# Patient Record
Sex: MALE | Race: WHITE | URBAN - METROPOLITAN AREA
[De-identification: names, ages, dates, MRNs, and addresses within clinical notes are randomized per-mention and may not be internally consistent; named-entity substitution may affect disease eponyms.]

---

## 2017-12-28 ENCOUNTER — TELEPHONE (OUTPATIENT)
Dept: FAMILY MEDICINE CLINIC | Age: 72
End: 2017-12-28

## 2017-12-28 DIAGNOSIS — R73.01 IFG (IMPAIRED FASTING GLUCOSE): Primary | ICD-10-CM

## 2017-12-28 DIAGNOSIS — E78.2 MIXED HYPERLIPIDEMIA: ICD-10-CM

## 2018-01-03 ENCOUNTER — HOSPITAL ENCOUNTER (OUTPATIENT)
Dept: LAB | Age: 73
Discharge: HOME | End: 2018-01-03
Payer: MEDICARE

## 2018-01-03 DIAGNOSIS — E78.2 MIXED HYPERLIPIDEMIA: ICD-10-CM

## 2018-01-03 DIAGNOSIS — R73.01 IFG (IMPAIRED FASTING GLUCOSE): ICD-10-CM

## 2018-01-03 LAB
ALBUMIN SERPL-MCNC: 4 G/DL (ref 3.4–5)
ALBUMIN/GLOB SERPL: 1.1 {RATIO} (ref 0.8–1.7)
ALP SERPL-CCNC: 68 U/L (ref 45–117)
ALT SERPL-CCNC: 41 U/L (ref 16–61)
ANION GAP SERPL CALC-SCNC: 7 MMOL/L (ref 3–18)
AST SERPL-CCNC: 22 U/L (ref 15–37)
BASOPHILS # BLD: 0 K/UL (ref 0–0.06)
BASOPHILS NFR BLD: 0 % (ref 0–2)
BILIRUB SERPL-MCNC: 0.5 MG/DL (ref 0.2–1)
BUN SERPL-MCNC: 17 MG/DL (ref 7–18)
BUN/CREAT SERPL: 19 (ref 12–20)
CALCIUM SERPL-MCNC: 8.8 MG/DL (ref 8.5–10.1)
CHLORIDE SERPL-SCNC: 105 MMOL/L (ref 100–108)
CHOLEST SERPL-MCNC: 241 MG/DL
CO2 SERPL-SCNC: 28 MMOL/L (ref 21–32)
CREAT SERPL-MCNC: 0.89 MG/DL (ref 0.6–1.3)
DIFFERENTIAL METHOD BLD: ABNORMAL
EOSINOPHIL # BLD: 0.1 K/UL (ref 0–0.4)
EOSINOPHIL NFR BLD: 2 % (ref 0–5)
ERYTHROCYTE [DISTWIDTH] IN BLOOD BY AUTOMATED COUNT: 13 % (ref 11.6–14.5)
GLOBULIN SER CALC-MCNC: 3.5 G/DL (ref 2–4)
GLUCOSE SERPL-MCNC: 93 MG/DL (ref 74–99)
HCT VFR BLD AUTO: 47 % (ref 36–48)
HDLC SERPL-MCNC: 59 MG/DL (ref 40–60)
HDLC SERPL: 4.1 {RATIO} (ref 0–5)
HGB BLD-MCNC: 15.8 G/DL (ref 13–16)
LDLC SERPL CALC-MCNC: 162.6 MG/DL (ref 0–100)
LIPID PROFILE,FLP: ABNORMAL
LYMPHOCYTES # BLD: 3 K/UL (ref 0.9–3.6)
LYMPHOCYTES NFR BLD: 49 % (ref 21–52)
MCH RBC QN AUTO: 32 PG (ref 24–34)
MCHC RBC AUTO-ENTMCNC: 33.6 G/DL (ref 31–37)
MCV RBC AUTO: 95.3 FL (ref 74–97)
MONOCYTES # BLD: 0.7 K/UL (ref 0.05–1.2)
MONOCYTES NFR BLD: 11 % (ref 3–10)
NEUTS SEG # BLD: 2.4 K/UL (ref 1.8–8)
NEUTS SEG NFR BLD: 38 % (ref 40–73)
PLATELET # BLD AUTO: 311 K/UL (ref 135–420)
PMV BLD AUTO: 10.9 FL (ref 9.2–11.8)
POTASSIUM SERPL-SCNC: 4.9 MMOL/L (ref 3.5–5.5)
PROT SERPL-MCNC: 7.5 G/DL (ref 6.4–8.2)
RBC # BLD AUTO: 4.93 M/UL (ref 4.7–5.5)
SODIUM SERPL-SCNC: 140 MMOL/L (ref 136–145)
TRIGL SERPL-MCNC: 97 MG/DL (ref ?–150)
VLDLC SERPL CALC-MCNC: 19.4 MG/DL
WBC # BLD AUTO: 6.3 K/UL (ref 4.6–13.2)

## 2018-01-03 PROCEDURE — 80053 COMPREHEN METABOLIC PANEL: CPT | Performed by: INTERNAL MEDICINE

## 2018-01-03 PROCEDURE — 85025 COMPLETE CBC W/AUTO DIFF WBC: CPT | Performed by: INTERNAL MEDICINE

## 2018-01-03 PROCEDURE — 80061 LIPID PANEL: CPT | Performed by: INTERNAL MEDICINE

## 2018-01-03 PROCEDURE — 36415 COLL VENOUS BLD VENIPUNCTURE: CPT | Performed by: INTERNAL MEDICINE

## 2018-01-10 ENCOUNTER — OFFICE VISIT (OUTPATIENT)
Dept: FAMILY MEDICINE CLINIC | Age: 73
End: 2018-01-10

## 2018-01-10 VITALS
TEMPERATURE: 97.4 F | HEART RATE: 82 BPM | WEIGHT: 180 LBS | HEIGHT: 70 IN | BODY MASS INDEX: 25.77 KG/M2 | SYSTOLIC BLOOD PRESSURE: 130 MMHG | DIASTOLIC BLOOD PRESSURE: 80 MMHG | RESPIRATION RATE: 20 BRPM | OXYGEN SATURATION: 92 %

## 2018-01-10 DIAGNOSIS — Z00.00 MEDICARE ANNUAL WELLNESS VISIT, SUBSEQUENT: Primary | ICD-10-CM

## 2018-01-10 DIAGNOSIS — E78.00 PURE HYPERCHOLESTEROLEMIA: ICD-10-CM

## 2018-01-10 NOTE — PATIENT INSTRUCTIONS

## 2018-01-10 NOTE — PROGRESS NOTES
This is a Subsequent Medicare Annual Wellness Exam (AWV) (Performed 12 months after IPPE or effective date of Medicare Part B enrollment)    I have reviewed the patient's medical history in detail and updated the computerized patient record. History     Past Medical History:   Diagnosis Date    Arthritis     Duodenal ulcer     Microhematuria     Nocturia     Prostate cancer screening       Past Surgical History:   Procedure Laterality Date    HX ENDOSCOPY  2002    ENDOSCOPY,COLON, DIAGNOSTIC    HX HERNIA REPAIR Right 12/2012    Inguinal.     Current Outpatient Prescriptions   Medication Sig Dispense Refill    aspirin (ASPIRIN) 325 mg tablet Take 325 mg by mouth daily. No Known Allergies  Family History   Problem Relation Age of Onset   [address] [facility] Arthritis-osteo Father     Hypertension Father      Social History   Substance Use Topics    Smoking status: Never Smoker    Smokeless tobacco: Never Used    Alcohol use No     Patient Active Problem List   Diagnosis Code    Other and unspecified hyperlipidemia E78.5    IFG (impaired fasting glucose) R73.01    Advance directive discussed with patient Z71.89       Depression Risk Factor Screening:     PHQ over the last two weeks 1/10/2018   Little interest or pleasure in doing things Not at all   Feeling down, depressed or hopeless Not at all   Total Score PHQ 2 0     Alcohol Risk Factor Screening: You do not drink alcohol or very rarely. Functional Ability and Level of Safety:   Hearing Loss  Hearing is good. Activities of Daily Living  The home contains: handrails and rugs  Patient does total self care    Fall Risk  Fall Risk Assessment, last 12 mths 1/10/2018   Able to walk? Yes   Fall in past 12 months?  No       Abuse Screen  Patient is not abused    Cognitive Screening   Evaluation of Cognitive Function:  Has your family/caregiver stated any concerns about your memory: no  Normal    Patient Care Team   Patient Care Team:  Nitin White MD as PCP - General (Internal Medicine)  Yeni Johnston MD (Urology)  Tyler Christy MD (Gastroenterology)    Assessment/Plan   Education and counseling provided:  Are appropriate based on today's review and evaluation  End-of-Life planning (with patient's consent), discussed AMD, pt is DNR, he did complete the forms today, will scan in chart  Influenza Vaccine, pt declined  Screening for glaucoma, he will see optometry next month    Diagnoses and all orders for this visit:    1. Medicare annual wellness visit, subsequent    2. Pure hypercholesterolemia, recent labs noted, pt declined to use lipitor, wants to repeat labs in 6 months  -     LIPID PANEL; Future        Health Maintenance Due   Topic Date Due    MEDICARE YEARLY EXAM  11/03/2017    GLAUCOMA SCREENING Q2Y  02/15/2018     Lab Results   Component Value Date/Time    Cholesterol, total 241 01/03/2018 07:52 AM    HDL Cholesterol 59 01/03/2018 07:52 AM    LDL, calculated 162.6 01/03/2018 07:52 AM    VLDL, calculated 19.4 01/03/2018 07:52 AM    Triglyceride 97 01/03/2018 07:52 AM    CHOL/HDL Ratio 4.1 01/03/2018 07:52 AM     Lab Results   Component Value Date/Time    Sodium 140 01/03/2018 07:52 AM    Potassium 4.9 01/03/2018 07:52 AM    Chloride 105 01/03/2018 07:52 AM    CO2 28 01/03/2018 07:52 AM    Anion gap 7 01/03/2018 07:52 AM    Glucose 93 01/03/2018 07:52 AM    BUN 17 01/03/2018 07:52 AM    Creatinine 0.89 01/03/2018 07:52 AM    BUN/Creatinine ratio 19 01/03/2018 07:52 AM    GFR est AA >60 01/03/2018 07:52 AM    GFR est non-AA >60 01/03/2018 07:52 AM    Calcium 8.8 01/03/2018 07:52 AM    Bilirubin, total 0.5 01/03/2018 07:52 AM    AST (SGOT) 22 01/03/2018 07:52 AM    Alk.  phosphatase 68 01/03/2018 07:52 AM    Protein, total 7.5 01/03/2018 07:52 AM    Albumin 4.0 01/03/2018 07:52 AM    Globulin 3.5 01/03/2018 07:52 AM    A-G Ratio 1.1 01/03/2018 07:52 AM    ALT (SGPT) 41 01/03/2018 07:52 AM   rtc 6 mos

## 2018-01-10 NOTE — PROGRESS NOTES
Jennifer Millard is a 81 y.o. male (: 1945) presenting to address:    Chief Complaint   Patient presents with    Complete Physical       Vitals:    01/10/18 1025   BP: 118/83   Pulse: 82   Resp: 20   Temp: 97.4 °F (36.3 °C)   TempSrc: Oral   SpO2: 92%   Weight: 180 lb (81.6 kg)   Height: 5' 10\" (1.778 m)   PainSc:   0 - No pain       Hearing/Vision:   No exam data present    Learning Assessment:     Learning Assessment 2015   PRIMARY LEARNER Patient   HIGHEST LEVEL OF EDUCATION - PRIMARY LEARNER  > 4 YEARS OF COLLEGE   BARRIERS PRIMARY LEARNER NONE   CO-LEARNER CAREGIVER No   PRIMARY LANGUAGE ENGLISH    NEED No   LEARNER PREFERENCE PRIMARY OTHER (COMMENT)   LEARNING SPECIAL TOPICS none   ANSWERED BY patient   RELATIONSHIP SELF   ASSESSMENT COMMENT n/a     Depression Screening:     PHQ over the last two weeks 1/10/2018   Little interest or pleasure in doing things Not at all   Feeling down, depressed or hopeless Not at all   Total Score PHQ 2 0     Fall Risk Assessment:     Fall Risk Assessment, last 12 mths 1/10/2018   Able to walk? Yes   Fall in past 12 months? No     Abuse Screening:     Abuse Screening Questionnaire 2016   Do you ever feel afraid of your partner? N   Are you in a relationship with someone who physically or mentally threatens you? N   Is it safe for you to go home? Y     Coordination of Care Questionaire:   1. Have you been to the ER, urgent care clinic since your last visit? Hospitalized since your last visit? NO    2. Have you seen or consulted any other health care providers outside of the [address] [place] [address] since your last visit? Include any pap smears or colon screening. NO    Advanced Directive:   1. Do you have an Advanced Directive? YES    2. Would you like information on Advanced Directives?  NO

## 2019-03-20 ENCOUNTER — HOSPITAL ENCOUNTER (OUTPATIENT)
Dept: LAB | Age: 74
Discharge: HOME | End: 2019-03-20
Payer: MEDICARE

## 2019-03-20 ENCOUNTER — OFFICE VISIT (OUTPATIENT)
Dept: FAMILY MEDICINE CLINIC | Age: 74
End: 2019-03-20

## 2019-03-20 VITALS
WEIGHT: 182.2 LBS | BODY MASS INDEX: 26.08 KG/M2 | SYSTOLIC BLOOD PRESSURE: 152 MMHG | HEART RATE: 81 BPM | TEMPERATURE: 96.4 F | OXYGEN SATURATION: 95 % | HEIGHT: 70 IN | DIASTOLIC BLOOD PRESSURE: 90 MMHG | RESPIRATION RATE: 18 BRPM

## 2019-03-20 DIAGNOSIS — Z12.5 PROSTATE CANCER SCREENING: ICD-10-CM

## 2019-03-20 DIAGNOSIS — E78.2 MIXED HYPERLIPIDEMIA: ICD-10-CM

## 2019-03-20 DIAGNOSIS — R73.01 IFG (IMPAIRED FASTING GLUCOSE): ICD-10-CM

## 2019-03-20 DIAGNOSIS — M75.41 IMPINGEMENT SYNDROME OF RIGHT SHOULDER: Primary | ICD-10-CM

## 2019-03-20 LAB
ALBUMIN SERPL-MCNC: 4.5 G/DL (ref 3.4–5)
ALBUMIN/GLOB SERPL: 1.6 {RATIO} (ref 0.8–1.7)
ALP SERPL-CCNC: 70 U/L (ref 45–117)
ALT SERPL-CCNC: 37 U/L (ref 16–61)
ANION GAP SERPL CALC-SCNC: 9 MMOL/L (ref 3–18)
APPEARANCE UR: CLEAR
AST SERPL-CCNC: 18 U/L (ref 15–37)
BASOPHILS # BLD: 0 K/UL (ref 0–0.1)
BASOPHILS NFR BLD: 1 % (ref 0–2)
BILIRUB SERPL-MCNC: 0.5 MG/DL (ref 0.2–1)
BILIRUB UR QL: NEGATIVE
BUN SERPL-MCNC: 13 MG/DL (ref 7–18)
BUN/CREAT SERPL: 15 (ref 12–20)
CALCIUM SERPL-MCNC: 8.8 MG/DL (ref 8.5–10.1)
CHLORIDE SERPL-SCNC: 102 MMOL/L (ref 100–108)
CHOLEST SERPL-MCNC: 235 MG/DL
CO2 SERPL-SCNC: 25 MMOL/L (ref 21–32)
COLOR UR: YELLOW
CREAT SERPL-MCNC: 0.89 MG/DL (ref 0.6–1.3)
DIFFERENTIAL METHOD BLD: ABNORMAL
EOSINOPHIL # BLD: 0.1 K/UL (ref 0–0.4)
EOSINOPHIL NFR BLD: 2 % (ref 0–5)
ERYTHROCYTE [DISTWIDTH] IN BLOOD BY AUTOMATED COUNT: 13.1 % (ref 11.6–14.5)
GLOBULIN SER CALC-MCNC: 2.9 G/DL (ref 2–4)
GLUCOSE SERPL-MCNC: 99 MG/DL (ref 74–99)
GLUCOSE UR STRIP.AUTO-MCNC: NEGATIVE MG/DL
HCT VFR BLD AUTO: 47.5 % (ref 36–48)
HDLC SERPL-MCNC: 67 MG/DL (ref 40–60)
HDLC SERPL: 3.5 {RATIO} (ref 0–5)
HGB BLD-MCNC: 15.4 G/DL (ref 13–16)
HGB UR QL STRIP: NEGATIVE
KETONES UR QL STRIP.AUTO: NEGATIVE MG/DL
LDLC SERPL CALC-MCNC: 148.4 MG/DL (ref 0–100)
LEUKOCYTE ESTERASE UR QL STRIP.AUTO: NEGATIVE
LIPID PROFILE,FLP: ABNORMAL
LYMPHOCYTES # BLD: 2.2 K/UL (ref 0.9–3.6)
LYMPHOCYTES NFR BLD: 35 % (ref 21–52)
MCH RBC QN AUTO: 31.4 PG (ref 24–34)
MCHC RBC AUTO-ENTMCNC: 32.4 G/DL (ref 31–37)
MCV RBC AUTO: 96.7 FL (ref 74–97)
MONOCYTES # BLD: 0.7 K/UL (ref 0.05–1.2)
MONOCYTES NFR BLD: 12 % (ref 3–10)
NEUTS SEG # BLD: 3.2 K/UL (ref 1.8–8)
NEUTS SEG NFR BLD: 50 % (ref 40–73)
NITRITE UR QL STRIP.AUTO: NEGATIVE
PH UR STRIP: 5.5 [PH] (ref 5–8)
PLATELET # BLD AUTO: 298 K/UL (ref 135–420)
PMV BLD AUTO: 10.7 FL (ref 9.2–11.8)
POTASSIUM SERPL-SCNC: 3.9 MMOL/L (ref 3.5–5.5)
PROT SERPL-MCNC: 7.4 G/DL (ref 6.4–8.2)
PROT UR STRIP-MCNC: NEGATIVE MG/DL
RBC # BLD AUTO: 4.91 M/UL (ref 4.7–5.5)
SODIUM SERPL-SCNC: 136 MMOL/L (ref 136–145)
SP GR UR REFRACTOMETRY: 1.01 (ref 1–1.03)
TRIGL SERPL-MCNC: 98 MG/DL (ref ?–150)
UROBILINOGEN UR QL STRIP.AUTO: 0.2 EU/DL (ref 0.2–1)
VLDLC SERPL CALC-MCNC: 19.6 MG/DL
WBC # BLD AUTO: 6.3 K/UL (ref 4.6–13.2)

## 2019-03-20 PROCEDURE — 36415 COLL VENOUS BLD VENIPUNCTURE: CPT

## 2019-03-20 PROCEDURE — 83036 HEMOGLOBIN GLYCOSYLATED A1C: CPT

## 2019-03-20 PROCEDURE — 85025 COMPLETE CBC W/AUTO DIFF WBC: CPT

## 2019-03-20 PROCEDURE — 80061 LIPID PANEL: CPT

## 2019-03-20 PROCEDURE — 84153 ASSAY OF PSA TOTAL: CPT

## 2019-03-20 PROCEDURE — 81003 URINALYSIS AUTO W/O SCOPE: CPT

## 2019-03-20 PROCEDURE — 80053 COMPREHEN METABOLIC PANEL: CPT

## 2019-03-20 RX ORDER — IBUPROFEN 600 MG/1
600 TABLET ORAL
Qty: 50 TAB | Refills: 0 | Status: SHIPPED | OUTPATIENT
Start: 2019-03-20 | End: 2022-01-31

## 2019-03-20 NOTE — PROGRESS NOTES
Juli Edwards is a 72 y.o. male (: 1945) presenting to address: Chief Complaint Patient presents with  Shoulder Pain  
  times two months Vitals:  
 19 1005 BP: 152/90 Pulse: 81 Resp: 18 Temp: 96.4 °F (35.8 °C) TempSrc: Oral  
SpO2: 95% Weight: 182 lb 3.2 oz (82.6 kg) Height: 5' 10\" (1.778 m) PainSc:   0 - No pain Hearing/Vision: No exam data present Learning Assessment:  
 
Learning Assessment 2015 PRIMARY LEARNER Patient HIGHEST LEVEL OF EDUCATION - PRIMARY LEARNER  > 4 YEARS OF COLLEGE  
BARRIERS PRIMARY LEARNER NONE  
CO-LEARNER CAREGIVER No  
PRIMARY LANGUAGE ENGLISH  NEED No  
LEARNER PREFERENCE PRIMARY OTHER (COMMENT) LEARNING SPECIAL TOPICS none ANSWERED BY patient RELATIONSHIP SELF  
ASSESSMENT COMMENT n/a Depression Screening:  
 
3 most recent PHQ Screens 3/20/2019 Little interest or pleasure in doing things Not at all Feeling down, depressed, irritable, or hopeless Not at all Total Score PHQ 2 0 Fall Risk Assessment:  
 
Fall Risk Assessment, last 12 mths 3/20/2019 Able to walk? Yes Fall in past 12 months? No  
 
Abuse Screening:  
 
Abuse Screening Questionnaire 1/10/2018 Do you ever feel afraid of your partner? Olu Walsh Are you in a relationship with someone who physically or mentally threatens you? Olu Walsh Is it safe for you to go home? Cayetano Primrose Coordination of Care Questionaire: 1. Have you been to the ER, urgent care clinic since your last visit? Hospitalized since your last visit? NO 
 
2. Have you seen or consulted any other health care providers outside of the [address] [place] [address] since your last visit? Include any pap smears or colon screening. NO Advanced Directive: 1. Do you have an Advanced Directive? NO 
 
2. Would you like information on Advanced Directives?  NO

## 2019-03-20 NOTE — PATIENT INSTRUCTIONS
Rotator Cuff Problems: Care Instructions Your Care Instructions The rotator cuff is a group of tendons and muscles around the shoulder that keeps the shoulder joint stable and allows you to raise and rotate your arm. Over time, daily wear and exercise can cause the tendons to rub on the bones of your shoulder. This is called impingement. This condition may cause the tendons to bruise, degenerate, or tear. In many people, these problems do not cause pain. When they do cause pain, you can use rest, physical therapy, ice and heat, and anti-inflammatory medicine to reduce pain and swelling. If you still have pain after trying these treatments, you and your doctor can discuss having a steroid injection or surgery. Follow-up care is a key part of your treatment and safety. Be sure to make and go to all appointments, and call your doctor if you are having problems. It's also a good idea to know your test results and keep a list of the medicines you take. How can you care for yourself at home? · Be safe with medicines. Read and follow all instructions on the label. ? If the doctor gave you a prescription medicine for pain, take it as prescribed. ? If you are not taking a prescription pain medicine, ask your doctor if you can take an over-the-counter medicine. · Put ice or a cold pack on your shoulder for 10 to 20 minutes at a time. Try to do this every 1 to 2 hours for the next 3 days (when you are awake). Put a thin cloth between the ice pack and your skin. · After 3 days, put a warm, wet towel on your shoulder. This is to relax the muscles and increase blood flow. While holding the towel on your shoulder, lean forward so your arm hangs freely, and gently swing your arm back and forth like a pendulum. You also can do this standing under a warm shower. · Follow your doctor's advice for physical therapy. When your doctor says it is okay, try these stretching exercises.  Do them slowly to avoid injury. Put a warm, wet towel on your shoulder before exercising. Stop any exercise that increases pain. ? Range-of-motion exercises. If it is not too painful, stretch your arm in four directions: across the body, up the back, to the side, and overhead. ? Pendulum exercise. Lean forward and hold onto a table or the back of a chair with your good arm. Bend at the waist, letting the arm with the sore shoulder hang straight down. Swing your arm back and forth like a pendulum, then in circles that start small and slowly grow larger. This exercise does not use the arm muscles. Instead, use your legs and your hips to create movement that makes your arm swing freely. Try this for about 5 minutes, several times a day. ? Wall climbing (to the side). Stand with your side to a wall so that your fingers can just touch it. Then turn so your body is turned slightly toward the wall. Walk the fingers of your injured arm up the wall as high as pain permits. Try not to shrug your shoulder up toward your ear as you move your arm up. Hold that position for a count of 15 to 30 seconds. Walk your fingers down to the starting position. Repeat 2 to 4 times, trying to reach higher each time. ? Wall climbing (to the front). Face a wall, standing so your fingers can just touch it. Walk the fingers of your affected arm up the wall as high as pain permits. Try not to shrug your shoulder up toward your ear as you move your arm up. Hold that position for a count of 15 to 30 seconds. Slowly walk your fingers to the starting position. Repeat 2 to 4 times, trying to reach higher each time. · Rest your shoulder when you are not doing stretches and other exercises. Your doctor may tell you to wait for the pain to go away before doing exercises. Do not lift heavy bags of groceries, play sports, or do anything else that makes you twist or stress your shoulder. Avoid activities where you move your affected arm above your head. When should you call for help? Call your doctor now or seek immediate medical care if: 
  · You have severe pain.  
  · You cannot move your shoulder or arm.  
  · You have tingling or numbness in your arm or hand.  
  · Your arm or hand is cool or pale.  
 Watch closely for changes in your health, and be sure to contact your doctor if: 
  · Your pain gets worse.  
  · You have new or worse swelling in your arm or hand.  
  · You do not get better as expected. Where can you learn more? Go to http://mode-roni.info/. Enter E207 in the search box to learn more about \"Rotator Cuff Problems: Care Instructions. \" Current as of: September 20, 2018 Content Version: 11.9 © 1109-6402 [employer]. Care instructions adapted under license by [employer] (which disclaims liability or warranty for this information). If you have questions about a medical condition or this instruction, always ask your healthcare professional. [place] [address] any warranty or liability for your use of this information. Rotator Cuff: Exercises Your Care Instructions Here are some examples of typical rehabilitation exercises for your condition. Start each exercise slowly. Ease off the exercise if you start to have pain. Your doctor or physical therapist will tell you when you can start these exercises and which ones will work best for you. How to do the exercises Pendulum swing 1. Hold on to a table or the back of a chair with your good arm. Then bend forward a little and let your sore arm hang straight down. This exercise does not use the arm muscles. Rather, use your legs and your hips to create movement that makes your arm swing freely. 2. Use the movement from your hips and legs to guide the slightly swinging arm back and forth like a pendulum (or elephant trunk). Then guide it in circles that start small (about the size of a dinner plate).  Make the circles a bit larger each day, as your pain allows. 3. Do this exercise for 5 minutes, 5 to 7 times each day. 4. As you have less pain, try bending over a little farther to do this exercise. This will increase the amount of movement at your shoulder. Posterior stretching exercise 1. Hold the elbow of your injured arm with your other hand. 2. Use your hand to pull your injured arm gently up and across your body. You will feel a gentle stretch across the back of your injured shoulder. 3. Hold for at least 15 to 30 seconds. Then slowly lower your arm. 4. Repeat 2 to 4 times. Up-the-back stretch 1. Put your hand in your back pocket. Let it rest there to stretch your shoulder. 2. With your other hand, hold your injured arm (palm outward) behind your back by the wrist. Pull your arm up gently to stretch your shoulder. 3. Next, put a towel over your other shoulder. Put the hand of your injured arm behind your back. Now hold the back end of the towel. With the other hand, hold the front end of the towel in front of your body. Pull gently on the front end of the towel. This will bring your hand farther up your back to stretch your shoulder. Overhead stretch 1. Standing about an arm's length away, grasp onto a solid surface. You could use a countertop, a doorknob, or the back of a sturdy chair. 2. With your knees slightly bent, bend forward with your arms straight. Lower your upper body, and let your shoulders stretch. 3. As your shoulders are able to stretch farther, you may need to take a step or two backward. 4. Hold for at least 15 to 30 seconds. Then stand up and relax. If you had stepped back during your stretch, step forward so you can keep your hands on the solid surface. 5. Repeat 2 to 4 times. Shoulder flexion (lying down) 1. Lie on your back, holding a wand with both hands. Your palms should face down as you hold the wand. 2. Keeping your elbows straight, slowly raise your arms over your head. Raise them until you feel a stretch in your shoulders, upper back, and chest. 
3. Hold for 15 to 30 seconds. 4. Repeat 2 to 4 times. Shoulder rotation (lying down) 1. Lie on your back. Hold a wand with both hands with your elbows bent and palms up. 2. Keep your elbows close to your body, and move the wand across your body toward the sore arm. 3. Hold for 8 to 12 seconds. 4. Repeat 2 to 4 times. Wall climbing (to the side) 1. Stand with your side to a wall so that your fingers can just touch it at an angle about 30 degrees toward the front of your body. 2. Walk the fingers of your injured arm up the wall as high as pain permits. Try not to shrug your shoulder up toward your ear as you move your arm up. 3. Hold that position for a count of at least 15 to 20. 
4. Walk your fingers back down to the starting position. 5. Repeat at least 2 to 4 times. Try to reach higher each time. Wall climbing (to the front) 1. Face a wall, and stand so your fingers can just touch it. 2. Keeping your shoulder down, walk the fingers of your injured arm up the wall as high as pain permits. (Don't shrug your shoulder up toward your ear.) 3. Hold your arm in that position for at least 15 to 30 seconds. 4. Slowly walk your fingers back down to where you started. 5. Repeat at least 2 to 4 times. Try to reach higher each time. Shoulder blade squeeze 1. Stand with your arms at your sides, and squeeze your shoulder blades together. Do not raise your shoulders up as you squeeze. 2. Hold 6 seconds. 3. Repeat 8 to 12 times. Scapular exercise: Arm reach 1. Lie flat on your back. This exercise is a very slight motion that starts with your arms raised (elbows straight, arms straight). 2. From this position, reach higher toward the ramos or ceiling. Keep your elbows straight. All motion should be from your shoulder blade only. 3. Relax your arms back to where you started. 4. Repeat 8 to 12 times. Arm raise to the side 1. Slowly raise your injured arm to the side, with your thumb facing up. Raise your arm 60 degrees at the most (shoulder level is 90 degrees). 2. Hold the position for 3 to 5 seconds. Then lower your arm back to your side. If you need to, bring your \"good\" arm across your body and place it under the elbow as you lower your injured arm. Use your good arm to keep your injured arm from dropping down too fast. 
3. Repeat 8 to 12 times. 4. When you first start out, don't hold any extra weight in your hand. As you get stronger, you may use a 1-pound to 2-pound dumbbell or a small can of food. Shoulder flexor and extensor exercise 1. Push forward (flex): Stand facing a wall or doorjamb, about 6 inches or less back. Hold your injured arm against your body. Make a closed fist with your thumb on top. Then gently push your hand forward into the wall with about 25% to 50% of your strength. Don't let your body move backward as you push. Hold for about 6 seconds. Relax for a few seconds. Repeat 8 to 12 times. 2. Push backward (extend): Stand with your back flat against a wall. Your upper arm should be against the wall, with your elbow bent 90 degrees (your hand straight ahead). Push your elbow gently back against the wall with about 25% to 50% of your strength. Don't let your body move forward as you push. Hold for about 6 seconds. Relax for a few seconds. Repeat 8 to 12 times. Scapular exercise: Wall push-ups 1. Stand facing a wall, about 12 inches to 18 inches away. 2. Place your hands on the wall at shoulder height. 3. Slowly bend your elbows and bring your face to the wall. Keep your back and hips straight. 4. Push back to where you started. 5. Repeat 8 to 12 times.  
6. When you can do this exercise against a wall comfortably, you can try it against a counter. You can then slowly progress to the end of a couch, then to a sturdy chair, and finally to the floor. Scapular exercise: Retraction 1. Put the band around a solid object at about waist level. (A bedpost will work well.) Each hand should hold an end of the band. 2. With your elbows at your sides and bent to 90 degrees, pull the band back. Your shoulder blades should move toward each other. Then move your arms back where you started. 3. Repeat 8 to 12 times. 4. If you have good range of motion in your shoulders, try this exercise with your arms lifted out to the sides. Keep your elbows at a 90-degree angle. Raise the elastic band up to about shoulder level. Pull the band back to move your shoulder blades toward each other. Then move your arms back where you started. Internal rotator strengthening exercise 1. Start by tying a piece of elastic exercise material to a doorknob. You can use surgical tubing or Thera-Band. 2. Stand or sit with your shoulder relaxed and your elbow bent 90 degrees. Your upper arm should rest comfortably against your side. Squeeze a rolled towel between your elbow and your body for comfort. This will help keep your arm at your side. 3. Hold one end of the elastic band in the hand of the painful arm. 4. Slowly rotate your forearm toward your body until it touches your belly. Slowly move it back to where you started. 5. Keep your elbow and upper arm firmly tucked against the towel roll or at your side. 6. Repeat 8 to 12 times. External rotator strengthening exercise 1. Start by tying a piece of elastic exercise material to a doorknob. You can use surgical tubing or Thera-Band. (You may also hold one end of the band in each hand.) 2. Stand or sit with your shoulder relaxed and your elbow bent 90 degrees. Your upper arm should rest comfortably against your side. Squeeze a rolled towel between your elbow and your body for comfort.  This will help keep your arm at your side. 3. Hold one end of the elastic band with the hand of the painful arm. 4. Start with your forearm across your belly. Slowly rotate the forearm out away from your body. Keep your elbow and upper arm tucked against the towel roll or the side of your body until you begin to feel tightness in your shoulder. Slowly move your arm back to where you started. 5. Repeat 8 to 12 times. Follow-up care is a key part of your treatment and safety. Be sure to make and go to all appointments, and call your doctor if you are having problems. It's also a good idea to know your test results and keep a list of the medicines you take. Where can you learn more? Go to http://mode-roni.info/. Enter Zahraa Barnett in the search box to learn more about \"Rotator Cuff: Exercises. \" Current as of: September 20, 2018 Content Version: 11.9 © 2018-5061 [employer]. Care instructions adapted under license by [employer] (which disclaims liability or warranty for this information). If you have questions about a medical condition or this instruction, always ask your healthcare professional. [address] any warranty or liability for your use of this information.

## 2019-03-20 NOTE — PROGRESS NOTES
HISTORY OF PRESENT ILLNESS Niraj Maravilla is a 72 y.o. male. HPI 
C/o right shoulder pain, started 2 months ago, on/off, worse when rasing his arm at the shoulder level, no trauma, pain is about 3-5/10 HLD, not controlled, he is not taking zocor, will repeat labs IFG, stable, last glucose was 93 Review of Systems Constitutional: Negative for fever. Respiratory: Negative for cough and wheezing. Cardiovascular: Negative for chest pain and palpitations. Gastrointestinal: Negative for nausea. Musculoskeletal: Negative for falls. Neurological: Negative for dizziness, tingling, weakness and headaches. Physical Exam  
Cardiovascular: Normal rate, regular rhythm and normal heart sounds. No murmur heard. Pulmonary/Chest: Effort normal and breath sounds normal. He has no wheezes. Musculoskeletal:  
     Right shoulder: He exhibits tenderness (mild, lateral tenderness) and pain. He exhibits normal range of motion, no bony tenderness, no swelling, no deformity and no spasm. Skin: No rash noted. Vitals reviewed. ASSESSMENT and PLAN Diagnoses and all orders for this visit: 
 
1. Impingement syndrome of right shoulder -     XR SHOULDER RT AP/LAT MIN 2 V; Future -     ibuprofen (MOTRIN) 600 mg tablet; Take 1 Tab by mouth every eight (8) hours as needed for Pain. Home exercises given, pt declined ref for PT 2. Prostate cancer screening -     PSA SCREENING (SCREENING); Future 3. IFG (impaired fasting glucose), stable 
-     CBC WITH AUTOMATED DIFF; Future -     LIPID PANEL; Future 
-     HEMOGLOBIN A1C WITH EAG; Future -     URINALYSIS W/ RFLX MICROSCOPIC; Future 4. Mixed hyperlipidemia, not controlled, will repeat labs -     CBC WITH AUTOMATED DIFF; Future -     LIPID PANEL; Future -     METABOLIC PANEL, COMPREHENSIVE; Future Lab Results Component Value Date/Time  Sodium 140 01/03/2018 07:52 AM  
 Potassium 4.9 01/03/2018 07:52 AM  
 Chloride 105 01/03/2018 07:52 AM  
 CO2 28 01/03/2018 07:52 AM  
 Anion gap 7 01/03/2018 07:52 AM  
 Glucose 93 01/03/2018 07:52 AM  
 BUN 17 01/03/2018 07:52 AM  
 Creatinine 0.89 01/03/2018 07:52 AM  
 BUN/Creatinine ratio 19 01/03/2018 07:52 AM  
 GFR est AA >60 01/03/2018 07:52 AM  
 GFR est non-AA >60 01/03/2018 07:52 AM  
 Calcium 8.8 01/03/2018 07:52 AM  
 Bilirubin, total 0.5 01/03/2018 07:52 AM  
 AST (SGOT) 22 01/03/2018 07:52 AM  
 Alk. phosphatase 68 01/03/2018 07:52 AM  
 Protein, total 7.5 01/03/2018 07:52 AM  
 Albumin 4.0 01/03/2018 07:52 AM  
 Globulin 3.5 01/03/2018 07:52 AM  
 A-G Ratio 1.1 01/03/2018 07:52 AM  
 ALT (SGPT) 41 01/03/2018 07:52 AM  
 
Lab Results Component Value Date/Time Cholesterol, total 241 (H) 01/03/2018 07:52 AM  
 HDL Cholesterol 59 01/03/2018 07:52 AM  
 LDL, calculated 162.6 (H) 01/03/2018 07:52 AM  
 VLDL, calculated 19.4 01/03/2018 07:52 AM  
 Triglyceride 97 01/03/2018 07:52 AM  
 CHOL/HDL Ratio 4.1 01/03/2018 07:52 AM  
 
Lab Results Component Value Date/Time Hemoglobin A1c 5.4 11/02/2016 12:24 PM  
 
rtc 2 weeks

## 2019-03-21 LAB
EST. AVERAGE GLUCOSE BLD GHB EST-MCNC: 114 MG/DL
HBA1C MFR BLD: 5.6 % (ref 4.2–5.6)
PSA SERPL-MCNC: 2.1 NG/ML (ref 0–4)

## 2019-04-05 ENCOUNTER — OFFICE VISIT (OUTPATIENT)
Dept: FAMILY MEDICINE CLINIC | Age: 74
End: 2019-04-05

## 2019-04-05 VITALS
HEART RATE: 83 BPM | OXYGEN SATURATION: 96 % | WEIGHT: 182.8 LBS | DIASTOLIC BLOOD PRESSURE: 71 MMHG | SYSTOLIC BLOOD PRESSURE: 135 MMHG | HEIGHT: 70 IN | RESPIRATION RATE: 16 BRPM | BODY MASS INDEX: 26.17 KG/M2 | TEMPERATURE: 97.7 F

## 2019-04-05 DIAGNOSIS — E78.2 MIXED HYPERLIPIDEMIA: ICD-10-CM

## 2019-04-05 DIAGNOSIS — M75.41 IMPINGEMENT SYNDROME OF RIGHT SHOULDER: ICD-10-CM

## 2019-04-05 DIAGNOSIS — R73.01 IFG (IMPAIRED FASTING GLUCOSE): Primary | ICD-10-CM

## 2019-04-05 PROBLEM — Z53.20 REFUSAL OF STATIN MEDICATION BY PATIENT: Status: ACTIVE | Noted: 2019-04-05

## 2019-04-05 NOTE — PROGRESS NOTES
HISTORY OF PRESENT ILLNESS  Ida Garduno is a 72 y.o. male. HPI  IFG, stable, recent labs noted, glucose 99, a1c 5.6  HLD, not controlled, recent LDl was 148, discussed statin Rx today, his calculated  10-year ASCVD risk is elevated and he need to be on statin but he refused, he\" does not want to take any statin\", ! 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 Cardiovascular: Negative for chest pain and palpitations. Neurological: Negative for dizziness, tingling and focal weakness. Physical Exam   Cardiovascular: Normal rate, regular rhythm and normal heart sounds. Pulmonary/Chest: Effort normal and breath sounds normal.   Musculoskeletal:        Right shoulder: He exhibits decreased range of motion (abduction to 90 degrees cause pain, empty can sign positive, lateral shoulder  tenderness) and tenderness. Vitals reviewed. ASSESSMENT and PLAN  Diagnoses and all orders for this visit:    1. IFG (impaired fasting glucose), stable    2. Mixed hyperlipidemia, not controlled, see above, pt repeatedly refused to take any statins, discussed benefits and side effects but he still reused! 3.  Impingement syndrome of right shoulder, improved but not resolved, will refer to Ortho, continue motrin and home exercises, he refused referral to PT  -     REFERRAL TO ORTHOPEDICS    Lab Results   Component Value Date/Time    Cholesterol, total 235 (H) 03/20/2019 11:10 AM    HDL Cholesterol 67 (H) 03/20/2019 11:10 AM    LDL, calculated 148.4 (H) 03/20/2019 11:10 AM    VLDL, calculated 19.6 03/20/2019 11:10 AM    Triglyceride 98 03/20/2019 11:10 AM    CHOL/HDL Ratio 3.5 03/20/2019 11:10 AM     Lab Results   Component Value Date/Time    Sodium 136 03/20/2019 11:10 AM    Potassium 3.9 03/20/2019 11:10 AM Chloride 102 03/20/2019 11:10 AM    CO2 25 03/20/2019 11:10 AM    Anion gap 9 03/20/2019 11:10 AM    Glucose 99 03/20/2019 11:10 AM    BUN 13 03/20/2019 11:10 AM    Creatinine 0.89 03/20/2019 11:10 AM    BUN/Creatinine ratio 15 03/20/2019 11:10 AM    GFR est AA >60 03/20/2019 11:10 AM    GFR est non-AA >60 03/20/2019 11:10 AM    Calcium 8.8 03/20/2019 11:10 AM    Bilirubin, total 0.5 03/20/2019 11:10 AM    AST (SGOT) 18 03/20/2019 11:10 AM    Alk.  phosphatase 70 03/20/2019 11:10 AM    Protein, total 7.4 03/20/2019 11:10 AM    Albumin 4.5 03/20/2019 11:10 AM    Globulin 2.9 03/20/2019 11:10 AM    A-G Ratio 1.6 03/20/2019 11:10 AM    ALT (SGPT) 37 03/20/2019 11:10 AM     Lab Results   Component Value Date/Time    Hemoglobin A1c 5.6 03/20/2019 11:10 AM

## 2019-04-05 NOTE — PROGRESS NOTES
Sherita Hollingsworth is a 72 y.o. male (: 1945) presenting to address:    Chief Complaint   Patient presents with    Labs     results        Vitals:    19 1139   BP: 135/71   Pulse: 83   Resp: 16   Temp: 97.7 °F (36.5 °C)   TempSrc: Oral   SpO2: 96%   Weight: 182 lb 12.8 oz (82.9 kg)   Height: 5' 10\" (1.778 m)   PainSc:   1   PainLoc: Arm       Hearing/Vision:   No exam data present    Learning Assessment:     Learning Assessment 2015   PRIMARY LEARNER Patient   HIGHEST LEVEL OF EDUCATION - PRIMARY LEARNER  > 4 YEARS OF COLLEGE   BARRIERS PRIMARY LEARNER NONE   CO-LEARNER CAREGIVER No   PRIMARY LANGUAGE ENGLISH    NEED No   LEARNER PREFERENCE PRIMARY OTHER (COMMENT)   LEARNING SPECIAL TOPICS none   ANSWERED BY patient   RELATIONSHIP SELF   ASSESSMENT COMMENT n/a     Depression Screening:     3 most recent PHQ Screens 3/20/2019   Little interest or pleasure in doing things Not at all   Feeling down, depressed, irritable, or hopeless Not at all   Total Score PHQ 2 0     Fall Risk Assessment:     Fall Risk Assessment, last 12 mths 3/20/2019   Able to walk? Yes   Fall in past 12 months? No     Abuse Screening:     Abuse Screening Questionnaire 1/10/2018   Do you ever feel afraid of your partner? N   Are you in a relationship with someone who physically or mentally threatens you? N   Is it safe for you to go home? Y     Coordination of Care Questionaire:   1. Have you been to the ER, urgent care clinic since your last visit? Hospitalized since your last visit? NO    2. Have you seen or consulted any other health care providers outside of the [address] [place] [address] since your last visit? Include any pap smears or colon screening. NO    Advanced Directive:   1. Do you have an Advanced Directive? YES    2. Would you like information on Advanced Directives?  NO

## 2019-05-15 DIAGNOSIS — M75.41 IMPINGEMENT SYNDROME OF RIGHT SHOULDER: Primary | ICD-10-CM

## 2019-05-17 ENCOUNTER — HOSPITAL ENCOUNTER (OUTPATIENT)
Dept: PHYSICAL THERAPY | Age: 74
Discharge: HOME | End: 2019-05-17
Payer: MEDICARE

## 2019-05-17 PROCEDURE — 97162 PT EVAL MOD COMPLEX 30 MIN: CPT

## 2019-05-17 PROCEDURE — 97110 THERAPEUTIC EXERCISES: CPT

## 2019-05-17 NOTE — PROGRESS NOTES
[address] [place] [address] [address], [address] [facility]       Phone: [phone]  Fax: [phone] / [facility]  Patient Name: Rodrigo Monroe : 1945   Medical   Diagnosis: R Shoulder Pain Treatment Diagnosis: Impingement syndrome of right shoulder [M75.41]   Onset Date: Chronic     Referral Source: Erich Roche MD Start of Care [facility]): 2019   Prior Hospitalization: See medical history Provider #: [id number]   Prior Level of Function: Chronic history of difficulty with reaching OH, lifting, sleep disturbances   Comorbidities: HTN, Arthritis   Medications: Verified on Patient Summary List   The Plan of Care and following information is based on the information from the initial evaluation.   ===========================================================================================  Assessment / key information:  Patient is a 72year old male presenting to therapy with signs and symptoms consistent with R shoulder pain. Patient states his symptoms began in January/2019 which he thinks may have been from lifting weights over head. Patient states his symptoms progressively worsened requiring him to go to the MD. Patient did receive x-rays which were positive for [facility] [place] joint arthritis. Patient reports increased difficulty with reaching OH, lifting and sleep disturbances. Patient notes symptoms feel better with medication and rest. Patient rates pain at worst 3/10 and 0/10 at best.   Objective Data: Inspection-Poor seated posture, forward head, rounded shoulders, anterior tipped scapula. Shoulder AROM: flex R 147 (P!) L 160; Abd R 160, L 165; TREVON R T2, L T2; FIR R R hip (P!), L T7. Strength Testing: Shoulder abd R4/5 (P!), L 5/5; ER R 4-/5 (P!), L 5/5; IR R 5/5, L 5/5. Special Tests: (+) Empty Can. Tenderness to R infraspinatus, R teres minor, R UT. FOTO: 63/100. Patient educated on diagnosis, prognosis, POC and HEP. Patient issued copy of HEP and denied additional questions. Patient will benefit from skilled PT in order to address these impairments and functional limitations.   ===========================================================================================  Eval Complexity: History MEDIUM  Complexity : 1-2 comorbidities / personal factors will impact the outcome/ POC ;  Examination  HIGH Complexity : 4+ Standardized tests and measures addressing body structure, function, activity limitation and / or participation in recreation ; Presentation MEDIUM Complexity : Evolving with changing characteristics ; Decision Making MEDIUM Complexity : FOTO score of 26-74; Overall Complexity MEDIUM  Problem List: pain affecting function, decrease ROM, decrease strength, decrease ADL/ functional abilitiies, decrease activity tolerance and decrease flexibility/ joint mobility   Treatment Plan may include any combination of the following: Therapeutic exercise, Therapeutic activities, Neuromuscular re-education, Physical agent/modality, Manual therapy, Patient education and Functional mobility training  Patient / Family readiness to learn indicated by: asking questions, trying to perform skills and interest  Persons(s) to be included in education: patient (P)  Barriers to Learning/Limitations: no  Measures taken:    Patient Goal (s): Reduce pain, improve ROM OH   Patient self reported health status: good  Rehabilitation Potential: good   Short Term Goals: To be accomplished in  3  weeks:  1. Patient will demonstrate independence with HEP for self management of symptoms. 2. Patient will report reduction in pain at worst to 1-2/10 in order to improve sleeping habits.  Long Term Goals: To be accomplished in  6  weeks:  1. Patient will improve FOTO to >/= 73/100 in order to improve quality of life.   2. Patient will improve R shoulder AROM to full and pain free into all planes in order to improve tolerance to reaching activities. 3. Patient will report a +5 on the GROC in order to prepare for DC to HEP. Frequency / Duration:   Patient to be seen  1-2  times per week for 6  weeks:  Patient / Caregiver education and instruction: self care, activity modification and exercises  G-Codes (GP): soto  Therapist Signature: Charli Razo, PT Date: 8/18/5240   Certification Period: 5/17/19-8/12/19 Time: 3:54 PM   ===========================================================================================  I certify that the above Physical Therapy Services are being furnished while the patient is under my care. I agree with the treatment plan and certify that this therapy is necessary. Physician Signature:        Date:       Time:     Please sign and return to [facility] or you may fax the signed copy to [phone]. Thank you.

## 2019-05-17 NOTE — PROGRESS NOTES
PHYSICAL THERAPY - DAILY TREATMENT NOTE    Patient Name: Brenton Coats        Date: 2019  : 1945   yes Patient  Verified  Visit #:   1     Insurance: Payor: [place] MED[place]ARE / Plan: [place] MEDICARE PART A & B / Product Type: Medicare /      In time: 320 Out time: 350   Total Treatment Time: 30     Medicare/BCBS Time Tracking (below)   Total Timed Codes (min):  10 1:1 Treatment Time:  30     TREATMENT AREA =  Impingement syndrome of right shoulder [M75.41]    SUBJECTIVE  Pain Level (on 0 to 10 scale):  0  / 10   Medication Changes/New allergies or changes in medical history, any new surgeries or procedures?    no  If yes, update Summary List   Subjective Functional Status/Changes:  []  No changes reported     See POC          OBJECTIVE    10 min Therapeutic Exercise:  [x]  See flow sheet   Rationale:      increase ROM and increase strength to improve the patients ability to perform reaching activities. Billed With/As:   [x] TE   [] TA   [] Neuro   [] Self Care Patient Education: [x] Review HEP    [] Progressed/Changed HEP based on:   [] positioning   [] body mechanics   [] transfers   [] heat/ice application    [] other:      Other Objective/Functional Measures:    See POC     Post Treatment Pain Level (on 0 to 10) scale:   0  / 10     ASSESSMENT  Assessment/Changes in Function:     See POC     []  See Progress Note/Recertification   Patient will continue to benefit from skilled PT services to modify and progress therapeutic interventions, address functional mobility deficits, address ROM deficits, address strength deficits, analyze and address soft tissue restrictions, analyze and cue movement patterns, analyze and modify body mechanics/ergonomics and assess and modify postural abnormalities to attain remaining goals.    Progress toward goals / Updated goals:    See POC     PLAN  [x]  Upgrade activities as tolerated yes Continue plan of care   []  Discharge due to :    []  Other:      Therapist: Ariadna Callejas, PT    Date: 5/17/2019 Time: 4:01 PM     Future Appointments   Date Time Provider Walker Mere   5/31/2019 11:30 AM Gia Yuan, PT [facility]   6/7/2019  1:00 PM Gia Yuan, PT [facility]   6/14/2019  1:00 PM Gia Yuan, PT [facility]   6/17/2019  8:30 AM Moe Medina, PT [facility]

## 2019-05-23 ENCOUNTER — APPOINTMENT (OUTPATIENT)
Dept: PHYSICAL THERAPY | Age: 74
End: 2019-05-23

## 2019-05-31 ENCOUNTER — HOSPITAL ENCOUNTER (OUTPATIENT)
Dept: PHYSICAL THERAPY | Age: 74
Discharge: HOME | End: 2019-05-31
Payer: MEDICARE

## 2019-05-31 PROCEDURE — 97140 MANUAL THERAPY 1/> REGIONS: CPT

## 2019-05-31 PROCEDURE — 97110 THERAPEUTIC EXERCISES: CPT

## 2019-05-31 NOTE — PROGRESS NOTES
PHYSICAL THERAPY - DAILY TREATMENT NOTE    Patient Name: Davina Marrufo        Date: 2019  : 1945   yes Patient  Verified  Visit #:   2   of     Insurance: Payor: Ginny Faye / Plan: [place] MEDICARE PART A & B / Product Type: Medicare /      In time: 4379 Out time: 8783   Total Treatment Time: 42     Medicare/BCBS Time Tracking (below)   Total Timed Codes (min):  32 1:1 Treatment Time:  25     TREATMENT AREA =  Impingement syndrome of right shoulder [M75.41]    SUBJECTIVE  Pain Level (on 0 to 10 scale):  1  / 10   Medication Changes/New allergies or changes in medical history, any new surgeries or procedures?    no  If yes, update Summary List   Subjective Functional Status/Changes:  []  No changes reported     Patient admits he has not tried his HEP yet because he feels like he does enough with biking and walking. Patient also states that he feels his shoulder is doing better by being lazy. OBJECTIVE  Modalities Rationale:     decrease inflammation and decrease pain to improve patient's ability to perform self care activities.     min [] Estim, type/location:                                      []  att     []  unatt     []  w/US     []  w/ice    []  w/heat    min []  Mechanical Traction: type/lbs                   []  pro   []  sup   []  int   []  cont    []  before manual    []  after manual    min []  Ultrasound, settings/location:      min []  Iontophoresis w/ dexamethasone, location:                                               []  take home patch       []  in clinic   10 min [x]  Ice     []  Heat    location/position: To R shoulder in supine with bolster    min []  Vasopneumatic Device, press/temp:     min []  Other:    [x] Skin assessment post-treatment (if applicable):    [x]  intact    []  redness- no adverse reaction     []redness - adverse reaction:        20 min Therapeutic Exercise:  [x]  See flow sheet   Rationale:      increase ROM and increase strength to improve the patients ability to perform reaching activities. 12 min Manual Therapy: STM to R posterior RC, R supraspinatus insertion, R shoulder PROM all planes, R GH joint mobs   Rationale:      decrease pain, increase ROM, increase tissue extensibility and decrease trigger points to improve patient's ability to perform carrying activities. Billed With/As:   [x] TE   [] TA   [] Neuro   [] Self Care Patient Education: [x] Review HEP    [] Progressed/Changed HEP based on:   [] positioning   [] body mechanics   [] transfers   [] heat/ice application    [] other:      Other Objective/Functional Measures:    1:1 TE 15'  Patient presenting with significant tenderness to R infraspinatus muscle belly and R supraspinatus insertion  Progressed therapy program per flowsheet in order to improve R shoulder ROM, mobility, strength   Post Treatment Pain Level (on 0 to 10) scale:   0  / 10     ASSESSMENT  Assessment/Changes in Function:     Emphasized to patient the importance of performing HEP consistently. Patient instructed that his symptoms will likely not improve with PT if he does not perform his home exercises, acknowledged understanding. []  See Progress Note/Recertification   Patient will continue to benefit from skilled PT services to modify and progress therapeutic interventions, address functional mobility deficits, address ROM deficits, address strength deficits, analyze and address soft tissue restrictions, analyze and cue movement patterns, analyze and modify body mechanics/ergonomics and assess and modify postural abnormalities to attain remaining goals.    Progress toward goals / Updated goals:    No progress with STG#1     PLAN  [x]  Upgrade activities as tolerated yes Continue plan of care   []  Discharge due to :    []  Other:      Therapist: Leigh Aiken PT    Date: 5/31/2019 Time: 12:41 PM     Future Appointments   Date Time Provider Walker Severino   6/7/2019  1:00 PM Humza Meyer, ISAIAS [facility]   6/14/2019  1:00 PM Trino Guthrie, PT [facility]   6/17/2019  8:30 AM Galen Hernandez, PT [facility]

## 2019-06-07 ENCOUNTER — HOSPITAL ENCOUNTER (OUTPATIENT)
Dept: PHYSICAL THERAPY | Age: 74
Discharge: HOME | End: 2019-06-07
Payer: MEDICARE

## 2019-06-07 PROCEDURE — 97140 MANUAL THERAPY 1/> REGIONS: CPT

## 2019-06-07 PROCEDURE — 97110 THERAPEUTIC EXERCISES: CPT

## 2019-06-07 NOTE — PROGRESS NOTES
PHYSICAL THERAPY - DAILY TREATMENT NOTE    Patient Name: Tory Millard        Date: 2019  : 1945   yes Patient  Verified  Visit #:   3   of     Insurance: Payor: Nick Solders / Plan: [place] MEDICARE PART A & B / Product Type: Medicare /      In time: 7881 Out time: 148   Total Treatment Time: 51     Medicare/BCBS Time Tracking (below)   Total Timed Codes (min):  41 1:1 Treatment Time:  25     TREATMENT AREA =  Impingement syndrome of right shoulder [M75.41]    SUBJECTIVE  Pain Level (on 0 to 10 scale):  1-2  / 10   Medication Changes/New allergies or changes in medical history, any new surgeries or procedures?    no  If yes, update Summary List   Subjective Functional Status/Changes:  []  No changes reported     Patient reports he remained compliant with his home exercises but didn't notice much of a change in symptoms. Patient states he is considering a steroid injection in to his shoulder to lower his pain. OBJECTIVE  Modalities Rationale:     decrease inflammation and decrease pain to improve patient's ability to perform self care activities.     min [] Estim, type/location:                                      []  att     []  unatt     []  w/US     []  w/ice    []  w/heat    min []  Mechanical Traction: type/lbs                   []  pro   []  sup   []  int   []  cont    []  before manual    []  after manual    min []  Ultrasound, settings/location:      min []  Iontophoresis w/ dexamethasone, location:                                               []  take home patch       []  in clinic   10 min [x]  Ice     []  Heat    location/position: To R shoulder in supine with bolster    min []  Vasopneumatic Device, press/temp:     min []  Other:    [x] Skin assessment post-treatment (if applicable):    [x]  intact    []  redness- no adverse reaction     []redness - adverse reaction:        26 min Therapeutic Exercise:  [x]  See flow sheet   Rationale:      increase ROM and increase strength to improve the patients ability to perform reaching activities. 15 min Manual Therapy: STM to R posterior RC, R supraspinatus insertion, R shoulder PROM all planes, R GH joint mobs   Rationale:      decrease pain, increase ROM, increase tissue extensibility and decrease trigger points to improve patient's ability to perform carrying activities. Billed With/As:   [x] TE   [] TA   [] Neuro   [] Self Care Patient Education: [x] Review HEP    [] Progressed/Changed HEP based on:   [] positioning   [] body mechanics   [] transfers   [] heat/ice application    [] other:      Other Objective/Functional Measures:    1:1 TE 10'  Increased repetitions for rows/ext and t-band ER this session for improved strength  Continued tenderness to posterior RC during MT, limited flex and IR PROM noted     Post Treatment Pain Level (on 0 to 10) scale:   1  / 10     ASSESSMENT  Assessment/Changes in Function:     Patient denied changes in symptoms post session. Educated patient on benefits and risks of steroid injection into R shoulder, patient acknowledged understanding. []  See Progress Note/Recertification   Patient will continue to benefit from skilled PT services to modify and progress therapeutic interventions, address functional mobility deficits, address ROM deficits, address strength deficits, analyze and address soft tissue restrictions, analyze and cue movement patterns, analyze and modify body mechanics/ergonomics and assess and modify postural abnormalities to attain remaining goals.    Progress toward goals / Updated goals:    Progressing with STG#1     PLAN  [x]  Upgrade activities as tolerated yes Continue plan of care   []  Discharge due to :    []  Other:      Therapist: Isha Jiménez PT    Date: 6/7/2019 Time: 2:09 PM     Future Appointments   Date Time Provider Walker Severino   6/14/2019  1:00 PM Kelly Dougherty, PT [facility]   6/17/2019  8:30 AM Cyndi Bazzi, PT [facility]

## 2019-06-14 ENCOUNTER — HOSPITAL ENCOUNTER (OUTPATIENT)
Dept: PHYSICAL THERAPY | Age: 74
Discharge: HOME | End: 2019-06-14
Payer: MEDICARE

## 2019-06-14 PROCEDURE — 97110 THERAPEUTIC EXERCISES: CPT

## 2019-06-14 PROCEDURE — 97140 MANUAL THERAPY 1/> REGIONS: CPT

## 2019-06-14 NOTE — PROGRESS NOTES
PHYSICAL THERAPY - DAILY TREATMENT NOTE    Patient Name: Nallely Garcia        Date: 2019  : 1945   yes Patient  Verified  Visit #:   4   of     Insurance: Payor: [employer] / Plan: [place] MED[place] & B / Product Type: Medicare /      In time: 4694 Out time: 138   Total Treatment Time: 41     Medicare/BCBS Time Tracking (below)   Total Timed Codes (min):  41 1:1 Treatment Time:  35     TREATMENT AREA =  Impingement syndrome of right shoulder [M75.41]    SUBJECTIVE  Pain Level (on 0 to 10 scale):  1  / 10   Medication Changes/New allergies or changes in medical history, any new surgeries or procedures?    no  If yes, update Summary List   Subjective Functional Status/Changes:  []  No changes reported     Patient reports minimal changes in his shoulder symptoms since his LV. Patient states he still has some pain when reaching all the way overhead. OBJECTIVE    31 min Therapeutic Exercise:  [x]  See flow sheet   Rationale:      increase ROM and increase strength to improve the patients ability to perform recreational activities. 10 min Manual Therapy: STM to R posterior RC, R supraspinatus insertion, R shoulder PROM all planes, R GH joint mobs   Rationale:      decrease pain, increase ROM, increase tissue extensibility and decrease trigger points to improve patient's ability to perform carrying activities. Billed With/As:   [x] TE   [] TA   [] Neuro   [] Self Care Patient Education: [x] Review HEP    [] Progressed/Changed HEP based on:   [] positioning   [] body mechanics   [] transfers   [] heat/ice application    [] other:      Other Objective/Functional Measures:    1:1 TE 25'  R shoulder flexion AROM 153, FIR L1 (P!)  Added finger ladder and IR stretch with strap this session     Post Treatment Pain Level (on 0 to 10) scale:   0  / 10     ASSESSMENT  Assessment/Changes in Function:     Patient noted improved symptoms post session.  Plan to update objective measurements at [place] in order to determine future POC. []  See Progress Note/Recertification   Patient will continue to benefit from skilled PT services to modify and progress therapeutic interventions, address functional mobility deficits, address ROM deficits, address strength deficits, analyze and address soft tissue restrictions, analyze and cue movement patterns, analyze and modify body mechanics/ergonomics and assess and modify postural abnormalities to attain remaining goals.    Progress toward goals / Updated goals:    Progressing with LTG#2     PLAN  [x]  Upgrade activities as tolerated yes Continue plan of care   []  Discharge due to :    []  Other:      Therapist: Yue Solano PT    Date: 6/14/2019 Time: 2:44 PM     Future Appointments   Date Time Provider Walker Severino   6/17/2019  8:30 AM Davida Carter, PT [facility]

## 2019-06-17 ENCOUNTER — HOSPITAL ENCOUNTER (OUTPATIENT)
Dept: PHYSICAL THERAPY | Age: 74
Discharge: HOME | End: 2019-06-17
Payer: MEDICARE

## 2019-06-17 PROCEDURE — 97140 MANUAL THERAPY 1/> REGIONS: CPT

## 2019-06-17 PROCEDURE — 97110 THERAPEUTIC EXERCISES: CPT

## 2019-06-17 NOTE — PROGRESS NOTES
PHYSICAL THERAPY - DAILY TREATMENT NOTE    Patient Name: Sandy Hyatt        Date: 2019  : 1945   yes Patient  Verified  Visit #:   5   of     Insurance: Payor: Trace Millard / Plan: [place] MEDICARE PART A & B / Product Type: Medicare /      In time: 898 Out time: 901   Total Treatment Time: 31     Medicare/BCBS Time Tracking (below)   Total Timed Codes (min):  31 1:1 Treatment Time:  25     TREATMENT AREA =  Impingement syndrome of right shoulder [M75.41]    SUBJECTIVE  Pain Level (on 0 to 10 scale):  0  / 10   Medication Changes/New allergies or changes in medical history, any new surgeries or procedures?    no  If yes, update Summary List   Subjective Functional Status/Changes:  []  No changes reported     Patient reports minimal changes in symptoms since the start of PT. Patient continues to experience pain with certain activities, however is unable to recall which activities cause this. Patient rates his pain at worst as a 2/10. OBJECTIVE    21 min Therapeutic Exercise:  [x]  See flow sheet   Rationale:      increase ROM and increase strength to improve the patients ability to perform reaching activities. 10 min Manual Therapy: STM to R posterior RC, R supraspinatus insertion, R shoulder PROM all planes, R GH joint mobs   Rationale:      decrease pain, increase ROM, increase tissue extensibility and decrease trigger points to improve patient's ability to perform carrying activities.      Billed With/As:   [x] TE   [] TA   [] Neuro   [] Self Care Patient Education: [x] Review HEP    [] Progressed/Changed HEP based on:   [] positioning   [] body mechanics   [] transfers   [] heat/ice application    [] other:      Other Objective/Functional Measures:    1:1 TE 15'  See DC note     Post Treatment Pain Level (on 0 to 10) scale:   0  / 10     ASSESSMENT  Assessment/Changes in Function:     See DC note           Progress toward goals / Updated goals:    See DC note     PLAN  []  Upgrade activities as tolerated no Continue plan of care   [x]  Discharge due to : Lack of progress toward goals   []  Other:      Therapist: Alcira Nichoel PT    Date: 6/17/2019 Time: 9:44 AM     No future appointments.

## 2019-06-17 NOTE — PROGRESS NOTES
[address] [place] [address], [address] [place] [address] - Phone: [phone]  Fax: [phone]  [facility] PHYSICAL THERAPY          Patient Name: Sergey Deleon : 1945   Treatment/Medical Diagnosis: Impingement syndrome of right shoulder [M75.41]   Onset Date: Chronic    Referral Source: Jan Stratton MD Start of [facility]): 19   Prior Hospitalization: See Medical History Provider #: [id number]   Prior Level of Function: Chronic history of difficulty with reaching OH, lifting, sleep disturbances   Comorbidities: HTN, Arthritis   Medications: Verified on Patient Summary List   Visits from [facility]: 5 Missed Visits: 0     Goal/Measure of Progress Goal Met? 1. Patient will improve FOTO to >/= 73/100 in order to improve quality of life   Status at last Eval: 63/100 Current Status: 59/100 no   2. Patient will improve R shoulder AROM to full and pain free into all planes in order to improve tolerance to reaching activities   Status at last Eval: Shoulder AROM: flex R 147 (P!) L 160; Abd R 160, L 165; TREVON R T2, L T2; FIR R R hip (P!), L T7 Current Status: R shoulder AROM flex 153, FIR L1, abd 160 progressing   3. Patient will report a +5 on the GROC in order to prepare for DC to HEP   Status at last Eval: n/a Current Status: +3 progressing     Key Functional Changes/Progress: Patient reports minimal changes in his symptoms since his IE on 19. Patient states there are still certain activities which aggravate his shoulder, but he is unable to state which exact activities which do so. Patient's flexion and FIR AROM has improved since his IE, however he continues to note pain at end range of both motions. Patient has been advised on current symptoms as well as which independent exercises or recreational activities may be contributing to his symptoms.  At this time, patient will be discharged from PT and continue with his program independently. G-Codes (GP): n/a  Assessments/Recommendations: Discontinue therapy due to lack of appreciable progress towards goals. If you have any questions/comments please contact us directly at [phone]. Thank you for allowing us to assist in the care of your patient. Therapist Signature:  Jak Cuadra PT Date: 6/17/19   Reporting Period: 5/17/19-6/17/19 Time: 9:36 AM

## 2020-02-12 ENCOUNTER — OFFICE VISIT (OUTPATIENT)
Dept: FAMILY MEDICINE CLINIC | Age: 75
End: 2020-02-12

## 2020-02-12 ENCOUNTER — HOSPITAL ENCOUNTER (OUTPATIENT)
Dept: LAB | Age: 75
Discharge: HOME | End: 2020-02-12
Payer: MEDICARE

## 2020-02-12 VITALS
DIASTOLIC BLOOD PRESSURE: 78 MMHG | RESPIRATION RATE: 20 BRPM | HEIGHT: 70 IN | HEART RATE: 81 BPM | TEMPERATURE: 98.1 F | WEIGHT: 186 LBS | SYSTOLIC BLOOD PRESSURE: 134 MMHG | OXYGEN SATURATION: 96 % | BODY MASS INDEX: 26.63 KG/M2

## 2020-02-12 DIAGNOSIS — R73.01 IFG (IMPAIRED FASTING GLUCOSE): ICD-10-CM

## 2020-02-12 DIAGNOSIS — E78.2 MIXED HYPERLIPIDEMIA: Primary | ICD-10-CM

## 2020-02-12 DIAGNOSIS — E78.2 MIXED HYPERLIPIDEMIA: ICD-10-CM

## 2020-02-12 LAB
ALBUMIN SERPL-MCNC: 4 G/DL (ref 3.4–5)
ALBUMIN/GLOB SERPL: 1.3 {RATIO} (ref 0.8–1.7)
ALP SERPL-CCNC: 62 U/L (ref 45–117)
ALT SERPL-CCNC: 30 U/L (ref 16–61)
ANION GAP SERPL CALC-SCNC: 6 MMOL/L (ref 3–18)
AST SERPL-CCNC: 20 U/L (ref 10–38)
BILIRUB SERPL-MCNC: 0.5 MG/DL (ref 0.2–1)
BUN SERPL-MCNC: 13 MG/DL (ref 7–18)
BUN/CREAT SERPL: 14 (ref 12–20)
CALCIUM SERPL-MCNC: 8.8 MG/DL (ref 8.5–10.1)
CHLORIDE SERPL-SCNC: 107 MMOL/L (ref 100–111)
CHOLEST SERPL-MCNC: 222 MG/DL
CO2 SERPL-SCNC: 27 MMOL/L (ref 21–32)
CREAT SERPL-MCNC: 0.9 MG/DL (ref 0.6–1.3)
EST. AVERAGE GLUCOSE BLD GHB EST-MCNC: 103 MG/DL
GLOBULIN SER CALC-MCNC: 3.2 G/DL (ref 2–4)
GLUCOSE SERPL-MCNC: 98 MG/DL (ref 74–99)
HBA1C MFR BLD: 5.2 % (ref 4.2–5.6)
HDLC SERPL-MCNC: 59 MG/DL (ref 40–60)
HDLC SERPL: 3.8 {RATIO} (ref 0–5)
LDLC SERPL CALC-MCNC: 145 MG/DL (ref 0–100)
LIPID PROFILE,FLP: ABNORMAL
POTASSIUM SERPL-SCNC: 4.5 MMOL/L (ref 3.5–5.5)
PROT SERPL-MCNC: 7.2 G/DL (ref 6.4–8.2)
SODIUM SERPL-SCNC: 140 MMOL/L (ref 136–145)
TRIGL SERPL-MCNC: 90 MG/DL (ref ?–150)
VLDLC SERPL CALC-MCNC: 18 MG/DL

## 2020-02-12 PROCEDURE — 83036 HEMOGLOBIN GLYCOSYLATED A1C: CPT

## 2020-02-12 PROCEDURE — 80061 LIPID PANEL: CPT

## 2020-02-12 PROCEDURE — 80053 COMPREHEN METABOLIC PANEL: CPT

## 2020-02-12 PROCEDURE — 36415 COLL VENOUS BLD VENIPUNCTURE: CPT

## 2020-02-12 NOTE — PROGRESS NOTES
Shahram Dodge is a 69 y.o. male (: 1945) presenting to address:    Chief Complaint   Patient presents with    Cholesterol Problem     Follow up       Vitals:    20 0739 20 0744   BP: 155/71 134/78   Pulse: 81    Resp: 20    Temp: 98.1 °F (36.7 °C)    TempSrc: Oral    SpO2: 96%    Weight: 186 lb (84.4 kg)    Height: 5' 10\" (1.778 m)    PainSc:   0 - No pain        Learning Assessment:     Learning Assessment 2015   PRIMARY LEARNER Patient   HIGHEST LEVEL OF EDUCATION - PRIMARY LEARNER  > 4 YEARS OF COLLEGE   BARRIERS PRIMARY LEARNER NONE   CO-LEARNER CAREGIVER No   PRIMARY LANGUAGE ENGLISH    NEED No   LEARNER PREFERENCE PRIMARY OTHER (COMMENT)   LEARNING SPECIAL TOPICS none   ANSWERED BY patient   RELATIONSHIP SELF   ASSESSMENT COMMENT n/a     Depression Screening:     3 most recent PHQ Screens 2020   Little interest or pleasure in doing things Not at all   Feeling down, depressed, irritable, or hopeless Not at all   Total Score PHQ 2 0     Fall Risk Assessment:     Fall Risk Assessment, last 12 mths 3/20/2019   Able to walk? Yes   Fall in past 12 months? No     Abuse Screening:     Abuse Screening Questionnaire 1/10/2018   Do you ever feel afraid of your partner? N   Are you in a relationship with someone who physically or mentally threatens you? N   Is it safe for you to go home? Y     Coordination of Care Questionaire:   1. Have you been to the ER, urgent care clinic since your last visit? Hospitalized since your last visit? NO    2. Have you seen or consulted any other health care providers outside of the [address] [place] [address] since your last visit? Include any pap smears or colon screening. NO    Advanced Directive:   1. Do you have an Advanced Directive? YES    2. Would you like information on Advanced Directives?  NO

## 2020-02-12 NOTE — PROGRESS NOTES
HISTORY OF PRESENT ILLNESS  Elva Sorensen is a 69 y.o. male. HPI  HLD, not well controlled, he declined statin last year, has been compliant with diet, will repeat labs  IFG, controlled, last a1c was 5.6, but he gained 4 lbs, advised to diet/exercise and lose weight  Review of Systems   Respiratory: Negative for cough and shortness of breath. Cardiovascular: Negative for chest pain and palpitations. Gastrointestinal: Negative for abdominal pain and nausea. Physical Exam  Vitals signs reviewed. Cardiovascular:      Rate and Rhythm: Normal rate and regular rhythm. Heart sounds: No murmur. Pulmonary:      Breath sounds: Normal breath sounds. No wheezing. Neurological:      Mental Status: He is alert and oriented to person, place, and time. ASSESSMENT and PLAN  Diagnoses and all orders for this visit:    1. Mixed hyperlipidemia, ? Control, will repeat labs  -     LIPID PANEL; Future    2. IFG (impaired fasting glucose), stable  -     HEMOGLOBIN A1C WITH EAG; Future  -     METABOLIC PANEL, COMPREHENSIVE;  Future

## 2020-02-15 NOTE — PROGRESS NOTES
Glucose and A1c are normal, good    Cholesterol is better than last year @ 222, but LDL is similar @ 145, recommend starting statin like Lipitor or Crestor, and follow up in 3 months, does pt want us to call it in to the pharmacy? ?

## 2020-10-28 ENCOUNTER — VIRTUAL VISIT (OUTPATIENT)
Dept: FAMILY MEDICINE CLINIC | Age: 75
End: 2020-10-28
Payer: MEDICARE

## 2020-10-28 DIAGNOSIS — L98.9 SKIN LESION: Primary | ICD-10-CM

## 2020-10-28 PROCEDURE — 99441 PR PHYS/QHP TELEPHONE EVALUATION 5-10 MIN: CPT | Performed by: INTERNAL MEDICINE

## 2020-10-28 NOTE — PROGRESS NOTES
Eileen aCstelan is a 70 y.o. male, evaluated via audio-only technology on 10/28/2020 for No chief complaint on file. .    Assessment & Plan:     Diagnoses and all orders for this visit:    1. Skin lesion  -     REFERRAL TO DERMATOLOGY      Follow-up and Dispositions    · Return in about 4 months (around 2/28/2021) for [address] at next earliest convenience. 12  Subjective:   Pt c/o skin lesion, black colored, started over a month ago , on the left side of his nasal bridge getting bigger, concerning for ca  No h/o skin cancer  Pt can not do video virtual visit today  He will need Dermatology consult soon to have it checked and biopsied or excised     Prior to Admission medications    Medication Sig Start Date End Date Taking? Authorizing Provider   ibuprofen (MOTRIN) 600 mg tablet Take 1 Tab by mouth every eight (8) hours as needed for Pain. 3/20/19   Ashly Gasca MD   aspirin (ASPIRIN) 325 mg tablet Take 325 mg by mouth daily.       Provider, Historical     Patient Active Problem List    Diagnosis Date Noted    Refusal of statin medication by patient 04/05/2019    IFG (impaired fasting glucose) 11/02/2016    Advance directive discussed with patient 11/02/2016    Other and unspecified hyperlipidemia 11/07/2012     Past Medical History:   Diagnosis Date    Arthritis     Duodenal ulcer     Microhematuria     Nocturia     Prostate cancer screening      Social History     Tobacco Use    Smoking status: Never Smoker    Smokeless tobacco: Never Used   Substance Use Topics    Alcohol use: No       ROS    Patient-Reported Vitals 10/28/2020   Patient-Reported Weight 180   Patient-Reported Height 5'9\"   Patient-Reported Pulse 79   Patient-Reported Temperature 98.6   Patient-Reported SpO2 98   Patient-Reported Systolic  003   Patient-Reported Diastolic 79        Eileen Castelan, who was evaluated through a patient-initiated, synchronous (real-time) audio only encounter, and/or her healthcare decision maker, is aware that it is a billable service, with coverage as determined by his insurance carrier. He provided verbal consent to proceed: Yes. He has not had a related appointment within my department in the past 7 days or scheduled within the next 24 hours.       Total Time: minutes: 5-10 minutes    Vibha Burnett MD

## 2022-01-31 ENCOUNTER — OFFICE VISIT (OUTPATIENT)
Dept: FAMILY MEDICINE CLINIC | Age: 77
End: 2022-01-31
Payer: MEDICARE

## 2022-01-31 VITALS
HEART RATE: 97 BPM | TEMPERATURE: 97 F | RESPIRATION RATE: 17 BRPM | DIASTOLIC BLOOD PRESSURE: 67 MMHG | SYSTOLIC BLOOD PRESSURE: 120 MMHG | HEIGHT: 68 IN | BODY MASS INDEX: 27.74 KG/M2 | WEIGHT: 183 LBS | OXYGEN SATURATION: 96 %

## 2022-01-31 DIAGNOSIS — Z12.5 PROSTATE CANCER SCREENING: ICD-10-CM

## 2022-01-31 DIAGNOSIS — E78.2 MIXED HYPERLIPIDEMIA: Primary | ICD-10-CM

## 2022-01-31 DIAGNOSIS — R73.03 PREDIABETES: ICD-10-CM

## 2022-01-31 PROCEDURE — G8510 SCR DEP NEG, NO PLAN REQD: HCPCS | Performed by: INTERNAL MEDICINE

## 2022-01-31 PROCEDURE — G8419 CALC BMI OUT NRM PARAM NOF/U: HCPCS | Performed by: INTERNAL MEDICINE

## 2022-01-31 PROCEDURE — 3017F COLORECTAL CA SCREEN DOC REV: CPT | Performed by: INTERNAL MEDICINE

## 2022-01-31 PROCEDURE — G8536 NO DOC ELDER MAL SCRN: HCPCS | Performed by: INTERNAL MEDICINE

## 2022-01-31 PROCEDURE — 1101F PT FALLS ASSESS-DOCD LE1/YR: CPT | Performed by: INTERNAL MEDICINE

## 2022-01-31 PROCEDURE — 99214 OFFICE O/P EST MOD 30 MIN: CPT | Performed by: INTERNAL MEDICINE

## 2022-01-31 PROCEDURE — G8427 DOCREV CUR MEDS BY ELIG CLIN: HCPCS | Performed by: INTERNAL MEDICINE

## 2022-01-31 RX ORDER — GUAIFENESIN 100 MG/5ML
81 LIQUID (ML) ORAL DAILY
COMMUNITY

## 2022-01-31 NOTE — PROGRESS NOTES
No ED, urgent CARE, saw eye dr Dr Nevaeh Rosado is a 78 y.o. male (: 1945) presenting to address:    Chief Complaint   Patient presents with    Medication Evaluation       Vitals:    22 1407   BP: 120/67   Pulse: 97   Resp: 17   Temp: 97 °F (36.1 °C)   SpO2: 96%   Weight: 183 lb (83 kg)   Height: 5' 8\" (1.727 m)   PainSc:   0 - No pain       Hearing/Vision:   No exam data present    Learning Assessment:     Learning Assessment 2015   PRIMARY LEARNER Patient   HIGHEST LEVEL OF EDUCATION - PRIMARY LEARNER  > 4 YEARS OF COLLEGE   BARRIERS PRIMARY LEARNER NONE   CO-LEARNER CAREGIVER No   PRIMARY LANGUAGE ENGLISH    NEED No   LEARNER PREFERENCE PRIMARY OTHER (COMMENT)   LEARNING SPECIAL TOPICS none   ANSWERED BY patient   RELATIONSHIP SELF   ASSESSMENT COMMENT n/a     Depression Screening:     3 most recent PHQ Screens 2022   Little interest or pleasure in doing things Not at all   Feeling down, depressed, irritable, or hopeless Not at all   Total Score PHQ 2 0     Fall Risk Assessment:     Fall Risk Assessment, last 12 mths 2022   Able to walk? Yes   Fall in past 12 months? 0   Do you feel unsteady? 0   Are you worried about falling 0     Abuse Screening:     Abuse Screening Questionnaire 2022   Do you ever feel afraid of your partner? N   Are you in a relationship with someone who physically or mentally threatens you? N   Is it safe for you to go home?  Y     ADL Assessment:     ADL Assessment 2022   Feeding yourself No Help Needed   Getting from bed to chair No Help Needed   Getting dressed No Help Needed   Bathing or showering No Help Needed   Walk across the room (includes cane/walker) No Help Needed   Using the telphone No Help Needed   Taking your medications No Help Needed   Preparing meals No Help Needed   Managing money (expenses/bills) No Help Needed   Moderately strenuous housework (laundry) No Help Needed   Shopping for personal items (toiletries/medicines) No Help Needed   Shopping for groceries No Help Needed   Driving No Help Needed   Climbing a flight of stairs No Help Needed   Getting to places beyond walking distances No Help Needed        Coordination of Care Questionaire:   1. \"Have you been to the ER, urgent care clinic since your last visit? Hospitalized since your last visit? \"    No     2. \"Have you seen or consulted any other health care providers outside of the [address] [place] [address] [place] since your last visit? \"  Yes eye Dr Brock Cluod      3. For patients aged 39-70: Has the patient had a colonoscopy? Yes   If the patient is femal    4. For patients aged 41-77: Has the patient had a mammogram within the past 2 years? Na   5. For patients aged 21-65: Has the patient had a pap smear? Na     Advanced Directive:   1. Do you have an Advanced Directive? Yes   2.  Would you like information on Advanced Directives   No

## 2022-02-01 NOTE — PROGRESS NOTES
HISTORY OF PRESENT ILLNESS  Dede Lake is a 78 y.o. male. HPI  HLD, not controlled, last LDl was 145, pt declined statin Rx, discussed today again statin treatment, pt wants to repeat labs first  Prediabetes, controlled, last a1c was 5.2, glucose 98  Review of Systems   Constitutional: Negative for fever. Respiratory: Negative for cough and shortness of breath. Cardiovascular: Negative for chest pain. Gastrointestinal: Negative for abdominal pain. Physical Exam  Vitals reviewed. Cardiovascular:      Rate and Rhythm: Normal rate and regular rhythm. Heart sounds: Normal heart sounds. No murmur heard. Pulmonary:      Effort: Pulmonary effort is normal.      Breath sounds: Normal breath sounds. Abdominal:      Palpations: Abdomen is soft. Tenderness: There is no abdominal tenderness. Musculoskeletal:      Right lower leg: No edema. Left lower leg: No edema. Neurological:      Mental Status: He is oriented to person, place, and time. ASSESSMENT and PLAN  Diagnoses and all orders for this visit:    1. Mixed hyperlipidemia, not controlled, will check labs  -     LIPID PANEL; Future  -     METABOLIC PANEL, COMPREHENSIVE; Future    2. Prediabetes, controlled  -     LIPID PANEL; Future  -     METABOLIC PANEL, COMPREHENSIVE; Future  -     HEMOGLOBIN A1C WITH EAG; Future    3. Prostate cancer screening  -     PSA SCREENING (SCREENING); Future      Follow-up and Dispositions    · Return for [address] at next earliest convenience.        Lab Results   Component Value Date/Time    Cholesterol, total 222 (H) 02/12/2020 08:11 AM    HDL Cholesterol 59 02/12/2020 08:11 AM    LDL, calculated 145 (H) 02/12/2020 08:11 AM    VLDL, calculated 18 02/12/2020 08:11 AM    Triglyceride 90 02/12/2020 08:11 AM    CHOL/HDL Ratio 3.8 02/12/2020 08:11 AM     Lab Results   Component Value Date/Time    Hemoglobin A1c 5.2 02/12/2020 08:11 AM     Lab Results   Component Value Date/Time    Sodium 140 02/12/2020 08:11 AM    Potassium 4.5 02/12/2020 08:11 AM    Chloride 107 02/12/2020 08:11 AM    CO2 27 02/12/2020 08:11 AM    Anion gap 6 02/12/2020 08:11 AM    Glucose 98 02/12/2020 08:11 AM    BUN 13 02/12/2020 08:11 AM    Creatinine 0.90 02/12/2020 08:11 AM    BUN/Creatinine ratio 14 02/12/2020 08:11 AM    GFR est AA >60 02/12/2020 08:11 AM    GFR est non-AA >60 02/12/2020 08:11 AM    Calcium 8.8 02/12/2020 08:11 AM    Bilirubin, total 0.5 02/12/2020 08:11 AM    Alk.  phosphatase 62 02/12/2020 08:11 AM    Protein, total 7.2 02/12/2020 08:11 AM    Albumin 4.0 02/12/2020 08:11 AM    Globulin 3.2 02/12/2020 08:11 AM    A-G Ratio 1.3 02/12/2020 08:11 AM    ALT (SGPT) 30 02/12/2020 08:11 AM    AST (SGOT) 20 02/12/2020 08:11 AM

## 2022-02-03 ENCOUNTER — APPOINTMENT (OUTPATIENT)
Dept: FAMILY MEDICINE CLINIC | Age: 77
End: 2022-02-03

## 2022-02-03 ENCOUNTER — HOSPITAL ENCOUNTER (OUTPATIENT)
Dept: LAB | Age: 77
Discharge: HOME | End: 2022-02-03
Payer: MEDICARE

## 2022-02-03 DIAGNOSIS — R73.03 PREDIABETES: ICD-10-CM

## 2022-02-03 DIAGNOSIS — E78.2 MIXED HYPERLIPIDEMIA: ICD-10-CM

## 2022-02-03 DIAGNOSIS — Z12.5 PROSTATE CANCER SCREENING: ICD-10-CM

## 2022-02-03 LAB
ALBUMIN SERPL-MCNC: 4 G/DL (ref 3.4–5)
ALBUMIN/GLOB SERPL: 1.3 {RATIO} (ref 0.8–1.7)
ALP SERPL-CCNC: 68 U/L (ref 45–117)
ALT SERPL-CCNC: 33 U/L (ref 16–61)
ANION GAP SERPL CALC-SCNC: 6 MMOL/L (ref 3–18)
AST SERPL-CCNC: 20 U/L (ref 10–38)
BILIRUB SERPL-MCNC: 0.7 MG/DL (ref 0.2–1)
BUN SERPL-MCNC: 14 MG/DL (ref 7–18)
BUN/CREAT SERPL: 15 (ref 12–20)
CALCIUM SERPL-MCNC: 9.3 MG/DL (ref 8.5–10.1)
CHLORIDE SERPL-SCNC: 103 MMOL/L (ref 100–111)
CHOLEST SERPL-MCNC: 222 MG/DL
CO2 SERPL-SCNC: 29 MMOL/L (ref 21–32)
CREAT SERPL-MCNC: 0.92 MG/DL (ref 0.6–1.3)
EST. AVERAGE GLUCOSE BLD GHB EST-MCNC: 114 MG/DL
GLOBULIN SER CALC-MCNC: 3.2 G/DL (ref 2–4)
GLUCOSE SERPL-MCNC: 99 MG/DL (ref 74–99)
HBA1C MFR BLD: 5.6 % (ref 4.2–5.6)
HDLC SERPL-MCNC: 63 MG/DL (ref 40–60)
HDLC SERPL: 3.5 {RATIO} (ref 0–5)
LDLC SERPL CALC-MCNC: 141.6 MG/DL (ref 0–100)
LIPID PROFILE,FLP: ABNORMAL
POTASSIUM SERPL-SCNC: 4.3 MMOL/L (ref 3.5–5.5)
PROT SERPL-MCNC: 7.2 G/DL (ref 6.4–8.2)
PSA SERPL-MCNC: 2.5 NG/ML (ref 0–4)
SODIUM SERPL-SCNC: 138 MMOL/L (ref 136–145)
TRIGL SERPL-MCNC: 87 MG/DL (ref ?–150)
VLDLC SERPL CALC-MCNC: 17.4 MG/DL

## 2022-02-03 PROCEDURE — 80061 LIPID PANEL: CPT

## 2022-02-03 PROCEDURE — 83036 HEMOGLOBIN GLYCOSYLATED A1C: CPT

## 2022-02-03 PROCEDURE — 84153 ASSAY OF PSA TOTAL: CPT

## 2022-02-03 PROCEDURE — 36415 COLL VENOUS BLD VENIPUNCTURE: CPT

## 2022-02-03 PROCEDURE — 80053 COMPREHEN METABOLIC PANEL: CPT

## 2022-02-06 NOTE — PROGRESS NOTES
PSA is normal  Cholesterol is similar to last visit, need to start Lipitor 10 mg daily, can we call it in?

## 2022-02-09 ENCOUNTER — TELEPHONE (OUTPATIENT)
Dept: FAMILY MEDICINE CLINIC | Age: 77
End: 2022-02-09

## 2022-02-09 DIAGNOSIS — E78.2 MIXED HYPERLIPIDEMIA: Primary | ICD-10-CM

## 2022-02-09 RX ORDER — ATORVASTATIN CALCIUM 20 MG/1
10 TABLET, FILM COATED ORAL DAILY
Qty: 90 TABLET | Refills: 0 | Status: SHIPPED | OUTPATIENT
Start: 2022-02-09

## 2022-02-09 NOTE — TELEPHONE ENCOUNTER
Regarding: Your medication suggestion  ----- Message from Ellard Place sent at 2/8/2022  8:59 AM EST -----       ----- Message from Kristen Fischer to Vani Tejada MD sent at 2/8/2022  8:45 AM -----   Thanks for your response, but I was asking for the advice of the person who suggested this drug. If this is the only response to my message to Dr. Baldemar Velarde, I am sorely disappointed in the level of communication afforded his patients. I would think that the response that you have given me is ample evidence that I am seeking better information than you are able to give me. For example, since the suggested drug and dose was Lipitor, 10mg, WALT, I would not be splitting that 10mg tablet, but would be splitting a larger tablet size, perhaps a 20mg tablet or maybe an 80mg tablet. You should refrain from taking it upon yourself to attempt to answer a question not addressed to you, and especially a question not within the bounds of your abilities or authority. Thank you anyway, but please let the person that I messaged answer me.      ----- Message -----       From:Nupur CLAIRE       Sent:2/8/2022  8:20 AM EST         To:Noman Ta    Subject:Your medication suggestion    Good morning,    We don't have that source of information here in the office. If you would like you can research this on your own and use your better judgement. Common side effect is cramping in the legs.  Lipitor 10 mg is a tiny pill it can not be split.      ----- Message -----       From:Noman Ta       Sent:2/8/2022  6:31 AM EST         To:Deshawn Godinez MD    Subject:Your medication suggestion    I know that my LDL has been elevated for some years now and that you suggest I take Lipitor, 10 mg. Perhaps you could provide a good source of reliable information about the available generics, most common side effects of them, and whether the medication has inherent characteristics that preclude pill splitting (do have a good pill splitter and am able to use it). Also, I am wondering about whether the generic that is best is widely available without prescription in [facility], especially in [facility] and [facility]. Any and all information is best known now before I decide about the risk/reward of starting it at all. I thank you for any and all help in deciding about this.

## 2022-02-09 NOTE — TELEPHONE ENCOUNTER
I called pt and discussed Lipitor Rx with him, discussed indications/ main S/E and dosing, he wanted Rx for 20 mg Lipitor and he will take half tablet daily, Rx sent. Advised pt to follow up in 3 months, and get labs done one week prior, labs ordered, pt verbalized understanding.

## 2022-03-18 PROBLEM — Z53.20 REFUSAL OF STATIN MEDICATION BY PATIENT: Status: ACTIVE | Noted: 2019-04-05

## 2022-03-19 PROBLEM — R73.03 PREDIABETES: Status: ACTIVE | Noted: 2022-01-31

## 2023-01-18 ENCOUNTER — TELEPHONE (OUTPATIENT)
Dept: FAMILY MEDICINE CLINIC | Age: 78
End: 2023-01-18

## 2023-01-18 DIAGNOSIS — Z12.5 PROSTATE CANCER SCREENING: ICD-10-CM

## 2023-01-18 DIAGNOSIS — R73.01 IFG (IMPAIRED FASTING GLUCOSE): ICD-10-CM

## 2023-01-18 DIAGNOSIS — E78.2 MIXED HYPERLIPIDEMIA: Primary | ICD-10-CM

## 2023-01-18 NOTE — TELEPHONE ENCOUNTER
Pt is wanting a PSA lab attached to his lab orders to get done before his upcoming appt. Please review chart and order lab if appropriate. Please let me know once that has been taken care of.      Future Appointments   Date Time Provider Walker Severino   1/30/2023  2:30 PM Radha Mendieta MD [facility]

## 2023-01-20 NOTE — TELEPHONE ENCOUNTER
Sent [employer] message for patient, informing him that his orders were placed and to let me know when he wants to come in for the bw.

## 2023-01-25 ENCOUNTER — HOSPITAL ENCOUNTER (OUTPATIENT)
Dept: LAB | Age: 78
Discharge: HOME | End: 2023-01-25
Payer: MEDICARE

## 2023-01-25 ENCOUNTER — APPOINTMENT (OUTPATIENT)
Dept: FAMILY MEDICINE CLINIC | Age: 78
End: 2023-01-25

## 2023-01-25 DIAGNOSIS — E78.2 MIXED HYPERLIPIDEMIA: ICD-10-CM

## 2023-01-25 DIAGNOSIS — Z12.5 PROSTATE CANCER SCREENING: ICD-10-CM

## 2023-01-25 DIAGNOSIS — R73.01 IFG (IMPAIRED FASTING GLUCOSE): ICD-10-CM

## 2023-01-25 LAB
ALBUMIN SERPL-MCNC: 3.9 G/DL (ref 3.4–5)
ALBUMIN/GLOB SERPL: 1.3 (ref 0.8–1.7)
ALP SERPL-CCNC: 78 U/L (ref 45–117)
ALT SERPL-CCNC: 32 U/L (ref 16–61)
ANION GAP SERPL CALC-SCNC: 5 MMOL/L (ref 3–18)
AST SERPL-CCNC: 16 U/L (ref 10–38)
BILIRUB SERPL-MCNC: 0.7 MG/DL (ref 0.2–1)
BUN SERPL-MCNC: 12 MG/DL (ref 7–18)
BUN/CREAT SERPL: 13 (ref 12–20)
CALCIUM SERPL-MCNC: 8.9 MG/DL (ref 8.5–10.1)
CHLORIDE SERPL-SCNC: 104 MMOL/L (ref 100–111)
CHOLEST SERPL-MCNC: 210 MG/DL
CO2 SERPL-SCNC: 29 MMOL/L (ref 21–32)
CREAT SERPL-MCNC: 0.95 MG/DL (ref 0.6–1.3)
EST. AVERAGE GLUCOSE BLD GHB EST-MCNC: 114 MG/DL
GLOBULIN SER CALC-MCNC: 3 G/DL (ref 2–4)
GLUCOSE SERPL-MCNC: 91 MG/DL (ref 74–99)
HBA1C MFR BLD: 5.6 % (ref 4.2–5.6)
HDLC SERPL-MCNC: 49 MG/DL (ref 40–60)
HDLC SERPL: 4.3 (ref 0–5)
LDLC SERPL CALC-MCNC: 140.8 MG/DL (ref 0–100)
LIPID PROFILE,FLP: ABNORMAL
POTASSIUM SERPL-SCNC: 4.3 MMOL/L (ref 3.5–5.5)
PROT SERPL-MCNC: 6.9 G/DL (ref 6.4–8.2)
PSA SERPL-MCNC: 2.2 NG/ML (ref 0–4)
SODIUM SERPL-SCNC: 138 MMOL/L (ref 136–145)
TRIGL SERPL-MCNC: 101 MG/DL (ref ?–150)
VLDLC SERPL CALC-MCNC: 20.2 MG/DL

## 2023-01-25 PROCEDURE — 84153 ASSAY OF PSA TOTAL: CPT

## 2023-01-25 PROCEDURE — 80061 LIPID PANEL: CPT

## 2023-01-25 PROCEDURE — 83036 HEMOGLOBIN GLYCOSYLATED A1C: CPT

## 2023-01-25 PROCEDURE — 36415 COLL VENOUS BLD VENIPUNCTURE: CPT

## 2023-01-25 PROCEDURE — 80053 COMPREHEN METABOLIC PANEL: CPT

## 2023-01-30 ENCOUNTER — OFFICE VISIT (OUTPATIENT)
Dept: FAMILY MEDICINE CLINIC | Age: 78
End: 2023-01-30
Payer: MEDICARE

## 2023-01-30 VITALS
OXYGEN SATURATION: 96 % | HEIGHT: 68 IN | DIASTOLIC BLOOD PRESSURE: 74 MMHG | WEIGHT: 177.8 LBS | TEMPERATURE: 97.9 F | HEART RATE: 95 BPM | RESPIRATION RATE: 16 BRPM | BODY MASS INDEX: 26.95 KG/M2 | SYSTOLIC BLOOD PRESSURE: 136 MMHG

## 2023-01-30 DIAGNOSIS — R73.01 IFG (IMPAIRED FASTING GLUCOSE): ICD-10-CM

## 2023-01-30 DIAGNOSIS — Z12.11 COLON CANCER SCREENING: ICD-10-CM

## 2023-01-30 DIAGNOSIS — E78.2 MIXED HYPERLIPIDEMIA: Primary | ICD-10-CM

## 2023-01-30 PROCEDURE — G8417 CALC BMI ABV UP PARAM F/U: HCPCS | Performed by: INTERNAL MEDICINE

## 2023-01-30 PROCEDURE — 3017F COLORECTAL CA SCREEN DOC REV: CPT | Performed by: INTERNAL MEDICINE

## 2023-01-30 PROCEDURE — 1101F PT FALLS ASSESS-DOCD LE1/YR: CPT | Performed by: INTERNAL MEDICINE

## 2023-01-30 PROCEDURE — G8536 NO DOC ELDER MAL SCRN: HCPCS | Performed by: INTERNAL MEDICINE

## 2023-01-30 PROCEDURE — G8510 SCR DEP NEG, NO PLAN REQD: HCPCS | Performed by: INTERNAL MEDICINE

## 2023-01-30 PROCEDURE — 99214 OFFICE O/P EST MOD 30 MIN: CPT | Performed by: INTERNAL MEDICINE

## 2023-01-30 PROCEDURE — 1123F ACP DISCUSS/DSCN MKR DOCD: CPT | Performed by: INTERNAL MEDICINE

## 2023-01-30 PROCEDURE — G8427 DOCREV CUR MEDS BY ELIG CLIN: HCPCS | Performed by: INTERNAL MEDICINE

## 2023-01-30 RX ORDER — ATORVASTATIN CALCIUM 20 MG/1
10 TABLET, FILM COATED ORAL DAILY
Qty: 90 TABLET | Refills: 0 | Status: SHIPPED | OUTPATIENT
Start: 2023-01-30

## 2023-01-30 NOTE — PROGRESS NOTES
HISTORY OF PRESENT ILLNESS  Sean Zimmer is a 78 y.o. male. HPI  HLD, not controlled, recent labs noted and discussed with pt today, , his 10-year CVD risk is elevated @ 27.7%, pt declined statin therapy before. IFG, stable, diet controlled, recent glucose was 91, A1c 5.6  Review of Systems   Respiratory:  Negative for shortness of breath. Cardiovascular:  Negative for chest pain and palpitations. Neurological:  Negative for dizziness and headaches. Physical Exam  Vitals reviewed. Cardiovascular:      Rate and Rhythm: Normal rate and regular rhythm. Pulmonary:      Effort: Pulmonary effort is normal.      Breath sounds: Normal breath sounds. Abdominal:      Palpations: Abdomen is soft. There is no hepatomegaly. Tenderness: There is no abdominal tenderness. Neurological:      Mental Status: He is oriented to person, place, and time. ASSESSMENT and PLAN  Diagnoses and all orders for this visit:    1. Mixed hyperlipidemia, not controlled, discussed statin treatment again today, advised pt to start Lipitor.  -     atorvastatin (LIPITOR) 20 mg tablet; Take 0.5 Tablets by mouth daily. 2. IFG (impaired fasting glucose), stable, continue diet    3.  Colon cancer screening  -     REFERRAL TO GASTROENTEROLOGY      Lab Results   Component Value Date/Time    Cholesterol, total 210 (H) 01/25/2023 07:28 AM    HDL Cholesterol 49 01/25/2023 07:28 AM    LDL, calculated 140.8 (H) 01/25/2023 07:28 AM    VLDL, calculated 20.2 01/25/2023 07:28 AM    Triglyceride 101 01/25/2023 07:28 AM    CHOL/HDL Ratio 4.3 01/25/2023 07:28 AM     Lab Results   Component Value Date/Time    Hemoglobin A1c 5.6 01/25/2023 07:28 AM     Lab Results   Component Value Date/Time    Sodium 138 01/25/2023 07:28 AM    Potassium 4.3 01/25/2023 07:28 AM    Chloride 104 01/25/2023 07:28 AM    CO2 29 01/25/2023 07:28 AM    Anion gap 5 01/25/2023 07:28 AM    Glucose 91 01/25/2023 07:28 AM    BUN 12 01/25/2023 07:28 AM Creatinine 0.95 01/25/2023 07:28 AM    BUN/Creatinine ratio 13 01/25/2023 07:28 AM    GFR est AA >60 02/03/2022 08:08 AM    GFR est non-AA >60 02/03/2022 08:08 AM    Calcium 8.9 01/25/2023 07:28 AM    Bilirubin, total 0.7 01/25/2023 07:28 AM    Alk. phosphatase 78 01/25/2023 07:28 AM    Protein, total 6.9 01/25/2023 07:28 AM    Albumin 3.9 01/25/2023 07:28 AM    Globulin 3.0 01/25/2023 07:28 AM    A-G Ratio 1.3 01/25/2023 07:28 AM    ALT (SGPT) 32 01/25/2023 07:28 AM    AST (SGOT) 16 01/25/2023 07:28 AM     The 10-year ASCVD risk score (Angelica SOLARES, et al., 2019) is: 27.7%    Values used to calculate the score:      Age: 78 years      Sex: Male      Is Non- : No      Diabetic: No      Tobacco smoker: No      Systolic Blood Pressure: 846 mmHg      Is BP treated: No      HDL Cholesterol: 49 MG/DL      Total Cholesterol: 210 MG/DL     Follow-up and Dispositions    Return in about 3 months (around 4/30/2023).

## 2023-01-30 NOTE — PROGRESS NOTES
Rome Hooper is a 78 y.o. male (: 1945) presenting to address:    Chief Complaint   Patient presents with    Medication Evaluation       Vitals:    23 1431   BP: (!) 170/68   Pulse: 95   Resp: 16   Temp: 97.9 °F (36.6 °C)   TempSrc: Temporal   SpO2: 96%   Weight: 177 lb 12.8 oz (80.6 kg)   Height: 5' 8\" (1.727 m)   PainSc:   0 - No pain       Hearing/Vision:   No results found. Learning Assessment:     Learning Assessment 2015   PRIMARY LEARNER Patient   HIGHEST LEVEL OF EDUCATION - PRIMARY LEARNER  > 4 YEARS OF COLLEGE   BARRIERS PRIMARY LEARNER NONE   CO-LEARNER CAREGIVER No   PRIMARY LANGUAGE ENGLISH    NEED No   LEARNER PREFERENCE PRIMARY OTHER (COMMENT)   LEARNING SPECIAL TOPICS none   ANSWERED BY patient   RELATIONSHIP SELF   ASSESSMENT COMMENT n/a     Depression Screening:     3 most recent PHQ Screens 2023   Little interest or pleasure in doing things Not at all   Feeling down, depressed, irritable, or hopeless Not at all   Total Score PHQ 2 0     Fall Risk Assessment:     Fall Risk Assessment, last 12 mths 2023   Able to walk? Yes   Fall in past 12 months? 0   Do you feel unsteady? 0   Are you worried about falling 0     Abuse Screening:     Abuse Screening Questionnaire 2023   Do you ever feel afraid of your partner? N   Are you in a relationship with someone who physically or mentally threatens you? N   Is it safe for you to go home?  Y     ADL Assessment:     ADL Assessment 2022   Feeding yourself No Help Needed   Getting from bed to chair No Help Needed   Getting dressed No Help Needed   Bathing or showering No Help Needed   Walk across the room (includes cane/walker) No Help Needed   Using the telphone No Help Needed   Taking your medications No Help Needed   Preparing meals No Help Needed   Managing money (expenses/bills) No Help Needed   Moderately strenuous housework (laundry) No Help Needed   Shopping for personal items (toiletries/medicines) No Help Needed   Shopping for groceries No Help Needed   Driving No Help Needed   Climbing a flight of stairs No Help Needed   Getting to places beyond walking distances No Help Needed        Coordination of Care Questionaire:   1. \"Have you been to the ER, urgent care clinic since your last visit? Hospitalized since your last visit? \" No    2. \"Have you seen or consulted any other health care providers outside of the [address] [place] [address] [place] since your last visit? \" No     3. For patients aged 39-70: Has the patient had a colonoscopy? NA - based on age     Advanced Directive:   1. Do you have an Advanced Directive? NO    2. Would you like information on Advanced Directives?  NO

## 2023-03-28 ENCOUNTER — CLINICAL DOCUMENTATION (OUTPATIENT)
Age: 78
End: 2023-03-28

## 2023-04-03 RX ORDER — LISINOPRIL 10 MG/1
10 TABLET ORAL DAILY
Qty: 90 TABLET | Refills: 0 | Status: SHIPPED | OUTPATIENT
Start: 2023-04-03

## 2023-09-22 ENCOUNTER — TELEPHONE (OUTPATIENT)
Dept: FAMILY MEDICINE CLINIC | Facility: CLINIC | Age: 78
End: 2023-09-22

## 2024-09-17 ENCOUNTER — TELEPHONE (OUTPATIENT)
Dept: FAMILY MEDICINE CLINIC | Facility: CLINIC | Age: 79
End: 2024-09-17